# Patient Record
Sex: FEMALE | Race: WHITE | Employment: FULL TIME | URBAN - METROPOLITAN AREA
[De-identification: names, ages, dates, MRNs, and addresses within clinical notes are randomized per-mention and may not be internally consistent; named-entity substitution may affect disease eponyms.]

---

## 2018-06-08 ENCOUNTER — OFFICE VISIT (OUTPATIENT)
Dept: FAMILY MEDICINE CLINIC | Age: 48
End: 2018-06-08

## 2018-06-08 VITALS
BODY MASS INDEX: 20.93 KG/M2 | HEIGHT: 65 IN | OXYGEN SATURATION: 99 % | WEIGHT: 125.6 LBS | DIASTOLIC BLOOD PRESSURE: 58 MMHG | HEART RATE: 72 BPM | SYSTOLIC BLOOD PRESSURE: 102 MMHG | RESPIRATION RATE: 16 BRPM

## 2018-06-08 DIAGNOSIS — Z12.39 SCREENING FOR BREAST CANCER: ICD-10-CM

## 2018-06-08 DIAGNOSIS — Z12.11 SCREENING FOR COLON CANCER: ICD-10-CM

## 2018-06-08 DIAGNOSIS — Z11.4 SCREENING FOR HIV (HUMAN IMMUNODEFICIENCY VIRUS): Primary | ICD-10-CM

## 2018-06-08 DIAGNOSIS — Z00.00 ANNUAL PHYSICAL EXAM: ICD-10-CM

## 2018-06-08 DIAGNOSIS — E55.9 VITAMIN D DEFICIENCY: ICD-10-CM

## 2018-06-08 DIAGNOSIS — R82.90 ABNORMAL URINALYSIS: ICD-10-CM

## 2018-06-08 LAB
ALBUMIN SERPL-MCNC: 4.2 G/DL (ref 3.4–5)
ALP BLD-CCNC: 41 U/L (ref 40–129)
ALT SERPL-CCNC: 17 U/L (ref 10–40)
ANION GAP SERPL CALCULATED.3IONS-SCNC: 14 MMOL/L (ref 3–16)
AST SERPL-CCNC: 20 U/L (ref 15–37)
BILIRUB SERPL-MCNC: 0.5 MG/DL (ref 0–1)
BILIRUBIN DIRECT: <0.2 MG/DL (ref 0–0.3)
BILIRUBIN, INDIRECT: NORMAL MG/DL (ref 0–1)
BILIRUBIN, POC: 0
BLOOD URINE, POC: ABNORMAL
BUN BLDV-MCNC: 14 MG/DL (ref 7–20)
CALCIUM SERPL-MCNC: 9.2 MG/DL (ref 8.3–10.6)
CHLORIDE BLD-SCNC: 99 MMOL/L (ref 99–110)
CHOLESTEROL, TOTAL: 176 MG/DL (ref 0–199)
CLARITY, POC: CLEAR
CO2: 25 MMOL/L (ref 21–32)
COLOR, POC: YELLOW
CREAT SERPL-MCNC: 0.9 MG/DL (ref 0.6–1.1)
GFR AFRICAN AMERICAN: >60
GFR NON-AFRICAN AMERICAN: >60
GLUCOSE BLD-MCNC: 79 MG/DL (ref 70–99)
GLUCOSE URINE, POC: 0
HCT VFR BLD CALC: 41.9 % (ref 36–48)
HDLC SERPL-MCNC: 38 MG/DL (ref 40–60)
HEMOGLOBIN: 14.1 G/DL (ref 12–16)
KETONES, POC: 0
LDL CHOLESTEROL CALCULATED: 110 MG/DL
LEUKOCYTE EST, POC: ABNORMAL
MCH RBC QN AUTO: 30.6 PG (ref 26–34)
MCHC RBC AUTO-ENTMCNC: 33.6 G/DL (ref 31–36)
MCV RBC AUTO: 91.2 FL (ref 80–100)
NITRITE, POC: 0
PDW BLD-RTO: 13.1 % (ref 12.4–15.4)
PH, POC: 7.5
PHOSPHORUS: 3.6 MG/DL (ref 2.5–4.9)
PLATELET # BLD: 273 K/UL (ref 135–450)
PMV BLD AUTO: 9.8 FL (ref 5–10.5)
POTASSIUM SERPL-SCNC: 4.5 MMOL/L (ref 3.5–5.1)
PROTEIN, POC: 0
RBC # BLD: 4.59 M/UL (ref 4–5.2)
SODIUM BLD-SCNC: 138 MMOL/L (ref 136–145)
SPECIFIC GRAVITY, POC: 1.02
TOTAL PROTEIN: 7.1 G/DL (ref 6.4–8.2)
TRIGL SERPL-MCNC: 138 MG/DL (ref 0–150)
TSH SERPL DL<=0.05 MIU/L-ACNC: 2.04 UIU/ML (ref 0.27–4.2)
UROBILINOGEN, POC: 0.2
VITAMIN D 25-HYDROXY: 86.3 NG/ML
VLDLC SERPL CALC-MCNC: 28 MG/DL
WBC # BLD: 8.8 K/UL (ref 4–11)

## 2018-06-08 PROCEDURE — 81002 URINALYSIS NONAUTO W/O SCOPE: CPT | Performed by: INTERNAL MEDICINE

## 2018-06-08 PROCEDURE — 99396 PREV VISIT EST AGE 40-64: CPT | Performed by: INTERNAL MEDICINE

## 2018-06-08 PROCEDURE — 36415 COLL VENOUS BLD VENIPUNCTURE: CPT | Performed by: INTERNAL MEDICINE

## 2018-06-08 RX ORDER — ESTRADIOL 0.1 MG/G
CREAM VAGINAL
COMMUNITY
Start: 2018-05-07

## 2018-06-08 RX ORDER — NORETHINDRONE ACETATE AND ETHINYL ESTRADIOL 1; .02 MG/1; MG/1
TABLET ORAL
COMMUNITY
End: 2021-08-27

## 2018-06-08 ASSESSMENT — ENCOUNTER SYMPTOMS
RESPIRATORY NEGATIVE: 1
EYES NEGATIVE: 1
ALLERGIC/IMMUNOLOGIC NEGATIVE: 1
GASTROINTESTINAL NEGATIVE: 1

## 2018-06-08 ASSESSMENT — PATIENT HEALTH QUESTIONNAIRE - PHQ9
1. LITTLE INTEREST OR PLEASURE IN DOING THINGS: 0
SUM OF ALL RESPONSES TO PHQ9 QUESTIONS 1 & 2: 0
SUM OF ALL RESPONSES TO PHQ QUESTIONS 1-9: 0
2. FEELING DOWN, DEPRESSED OR HOPELESS: 0

## 2018-06-11 ENCOUNTER — TELEPHONE (OUTPATIENT)
Dept: FAMILY MEDICINE CLINIC | Age: 48
End: 2018-06-11

## 2018-06-11 LAB
HIV AG/AB: NORMAL
HIV ANTIGEN: NORMAL
HIV-1 ANTIBODY: NORMAL
HIV-2 AB: NORMAL
ORGANISM: ABNORMAL
URINE CULTURE, ROUTINE: ABNORMAL
URINE CULTURE, ROUTINE: ABNORMAL

## 2018-07-08 PROBLEM — Z00.00 ANNUAL PHYSICAL EXAM: Status: RESOLVED | Noted: 2018-06-08 | Resolved: 2018-07-08

## 2019-01-18 ENCOUNTER — TELEPHONE (OUTPATIENT)
Dept: OTHER | Age: 49
End: 2019-01-18

## 2019-03-08 ENCOUNTER — OFFICE VISIT (OUTPATIENT)
Dept: FAMILY MEDICINE CLINIC | Age: 49
End: 2019-03-08
Payer: COMMERCIAL

## 2019-03-08 VITALS
DIASTOLIC BLOOD PRESSURE: 62 MMHG | WEIGHT: 127.8 LBS | SYSTOLIC BLOOD PRESSURE: 104 MMHG | OXYGEN SATURATION: 98 % | TEMPERATURE: 98.5 F | HEART RATE: 91 BPM | HEIGHT: 65 IN | BODY MASS INDEX: 21.29 KG/M2

## 2019-03-08 DIAGNOSIS — R52 BODY ACHES: ICD-10-CM

## 2019-03-08 DIAGNOSIS — J02.0 ACUTE STREPTOCOCCAL PHARYNGITIS: Primary | ICD-10-CM

## 2019-03-08 LAB
INFLUENZA A ANTIBODY: NEGATIVE
INFLUENZA B ANTIBODY: NEGATIVE
S PYO AG THROAT QL: POSITIVE

## 2019-03-08 PROCEDURE — 87880 STREP A ASSAY W/OPTIC: CPT | Performed by: PHYSICIAN ASSISTANT

## 2019-03-08 PROCEDURE — 87804 INFLUENZA ASSAY W/OPTIC: CPT | Performed by: PHYSICIAN ASSISTANT

## 2019-03-08 PROCEDURE — 99213 OFFICE O/P EST LOW 20 MIN: CPT | Performed by: PHYSICIAN ASSISTANT

## 2019-03-08 RX ORDER — AMOXICILLIN 500 MG/1
500 CAPSULE ORAL 3 TIMES DAILY
Qty: 30 CAPSULE | Refills: 0 | Status: SHIPPED | OUTPATIENT
Start: 2019-03-08 | End: 2019-03-18

## 2019-03-08 ASSESSMENT — PATIENT HEALTH QUESTIONNAIRE - PHQ9
SUM OF ALL RESPONSES TO PHQ9 QUESTIONS 1 & 2: 0
1. LITTLE INTEREST OR PLEASURE IN DOING THINGS: 0
2. FEELING DOWN, DEPRESSED OR HOPELESS: 0
SUM OF ALL RESPONSES TO PHQ QUESTIONS 1-9: 0
SUM OF ALL RESPONSES TO PHQ QUESTIONS 1-9: 0

## 2019-08-15 ENCOUNTER — OFFICE VISIT (OUTPATIENT)
Dept: FAMILY MEDICINE CLINIC | Age: 49
End: 2019-08-15
Payer: COMMERCIAL

## 2019-08-15 VITALS
BODY MASS INDEX: 21.89 KG/M2 | HEART RATE: 69 BPM | TEMPERATURE: 98.6 F | HEIGHT: 65 IN | RESPIRATION RATE: 16 BRPM | WEIGHT: 131.4 LBS | SYSTOLIC BLOOD PRESSURE: 102 MMHG | DIASTOLIC BLOOD PRESSURE: 60 MMHG | OXYGEN SATURATION: 98 %

## 2019-08-15 DIAGNOSIS — J02.9 ACUTE VIRAL PHARYNGITIS: ICD-10-CM

## 2019-08-15 DIAGNOSIS — J02.0 ACUTE STREPTOCOCCAL PHARYNGITIS: Primary | ICD-10-CM

## 2019-08-15 LAB — S PYO AG THROAT QL: NORMAL

## 2019-08-15 PROCEDURE — 99212 OFFICE O/P EST SF 10 MIN: CPT | Performed by: INTERNAL MEDICINE

## 2019-08-15 PROCEDURE — 87880 STREP A ASSAY W/OPTIC: CPT | Performed by: INTERNAL MEDICINE

## 2019-08-15 ASSESSMENT — ENCOUNTER SYMPTOMS
SORE THROAT: 1
RHINORRHEA: 0
RESPIRATORY NEGATIVE: 1
ALLERGIC/IMMUNOLOGIC NEGATIVE: 1
SINUS PRESSURE: 0
SINUS PAIN: 0
VOICE CHANGE: 0
TROUBLE SWALLOWING: 0
GASTROINTESTINAL NEGATIVE: 1

## 2019-08-28 ENCOUNTER — OFFICE VISIT (OUTPATIENT)
Dept: FAMILY MEDICINE CLINIC | Age: 49
End: 2019-08-28
Payer: COMMERCIAL

## 2019-08-28 VITALS
DIASTOLIC BLOOD PRESSURE: 80 MMHG | HEIGHT: 65 IN | RESPIRATION RATE: 16 BRPM | SYSTOLIC BLOOD PRESSURE: 119 MMHG | OXYGEN SATURATION: 99 % | WEIGHT: 133.2 LBS | BODY MASS INDEX: 22.19 KG/M2 | HEART RATE: 65 BPM | TEMPERATURE: 97.8 F

## 2019-08-28 DIAGNOSIS — J02.9 SORE THROAT: Primary | ICD-10-CM

## 2019-08-28 DIAGNOSIS — J06.9 ACUTE UPPER RESPIRATORY INFECTION: ICD-10-CM

## 2019-08-28 DIAGNOSIS — J03.01 RECURRENT STREPTOCOCCAL TONSILLITIS: ICD-10-CM

## 2019-08-28 LAB — S PYO AG THROAT QL: NORMAL

## 2019-08-28 PROCEDURE — 87880 STREP A ASSAY W/OPTIC: CPT | Performed by: PHYSICIAN ASSISTANT

## 2019-08-28 PROCEDURE — 99213 OFFICE O/P EST LOW 20 MIN: CPT | Performed by: PHYSICIAN ASSISTANT

## 2019-08-28 RX ORDER — AMOXICILLIN AND CLAVULANATE POTASSIUM 875; 125 MG/1; MG/1
1 TABLET, FILM COATED ORAL 2 TIMES DAILY
Qty: 14 TABLET | Refills: 0 | Status: SHIPPED | OUTPATIENT
Start: 2019-08-28 | End: 2019-09-04

## 2019-08-29 ENCOUNTER — OFFICE VISIT (OUTPATIENT)
Dept: ENT CLINIC | Age: 49
End: 2019-08-29
Payer: COMMERCIAL

## 2019-08-29 VITALS
HEIGHT: 65 IN | BODY MASS INDEX: 21.86 KG/M2 | WEIGHT: 131.2 LBS | HEART RATE: 72 BPM | SYSTOLIC BLOOD PRESSURE: 120 MMHG | TEMPERATURE: 98.3 F | DIASTOLIC BLOOD PRESSURE: 74 MMHG

## 2019-08-29 DIAGNOSIS — J03.91 RECURRENT TONSILLITIS: Primary | ICD-10-CM

## 2019-08-29 PROCEDURE — 99203 OFFICE O/P NEW LOW 30 MIN: CPT | Performed by: OTOLARYNGOLOGY

## 2019-08-29 ASSESSMENT — ENCOUNTER SYMPTOMS
SORE THROAT: 1
COUGH: 0
FACIAL SWELLING: 0
SINUS PRESSURE: 0
EYE ITCHING: 0
VOMITING: 0
SHORTNESS OF BREATH: 0
VOICE CHANGE: 0
TROUBLE SWALLOWING: 0
NAUSEA: 0
APNEA: 0

## 2019-08-29 NOTE — PROGRESS NOTES
Head: Normocephalic and atraumatic. Right Ear: Tympanic membrane, external ear and ear canal normal. No drainage. No middle ear effusion. Left Ear: Tympanic membrane, external ear and ear canal normal. No drainage. No middle ear effusion. Nose: Septal deviation (Right) present. No mucosal edema or rhinorrhea. Mouth/Throat: Uvula is midline, oropharynx is clear and moist and mucous membranes are normal. Tonsils are 2+ on the right. Tonsils are 2+ on the left. No tonsillar exudate. Eyes: Pupils are equal, round, and reactive to light. EOM are normal.   Neck: Trachea normal, full passive range of motion without pain and phonation normal. No thyroid mass and no thyromegaly present. Cardiovascular: Normal pulses. Pulmonary/Chest: Effort normal. No accessory muscle usage or stridor. No apnea. No respiratory distress. Lymphadenopathy:        Head (right side): No submental and no submandibular adenopathy present. Head (left side): No submental and no submandibular adenopathy present. She has no cervical adenopathy. Right cervical: No superficial cervical, no deep cervical and no posterior cervical adenopathy present. Left cervical: No superficial cervical, no deep cervical and no posterior cervical adenopathy present. Neurological: She is alert and oriented to person, place, and time. She has normal strength. No cranial nerve deficit. Coordination and gait normal.   Skin: Skin is warm and dry. Psychiatric: She has a normal mood and affect. Thought content normal.       Assessment and Plan     1. Recurrent tonsillitis  -Explained options of continued medical management versus tonsillectomy  -She does not wish to proceed with any additional treatment at this time  -No evidence of acute bacterial infection today  -Return as needed      Kieran Moore DO  8/29/19      Portions of this note were dictated using Dragon.  There may be linguistic errors secondary to the use of this

## 2020-04-09 ENCOUNTER — OFFICE VISIT (OUTPATIENT)
Dept: PRIMARY CARE CLINIC | Age: 50
End: 2020-04-09
Payer: COMMERCIAL

## 2020-04-09 VITALS — TEMPERATURE: 97.9 F | HEART RATE: 70 BPM | OXYGEN SATURATION: 98 %

## 2020-04-09 PROCEDURE — 99213 OFFICE O/P EST LOW 20 MIN: CPT | Performed by: NURSE PRACTITIONER

## 2020-04-09 RX ORDER — AMOXICILLIN 875 MG/1
875 TABLET, COATED ORAL 2 TIMES DAILY
Qty: 20 TABLET | Refills: 0 | Status: SHIPPED | OUTPATIENT
Start: 2020-04-09 | End: 2020-04-19

## 2020-04-09 NOTE — PROGRESS NOTES
4/9/2020    FLU/COVID-19 CLINIC EVALUATION    HPI   SYMPTOMS:    Symptom duration, days:since February   [] 1   [] 2   [] 3   [] 4   [] 5   [] 6   [] 7   [] 8   [] 9   [] 10   [] 11   [] 12   [] 13 [x] 14 +    Symptom course:   [x] Worsening     [] Stable     [] Improving    [] Fevers   [] Symptom (not measured)  [] Measured (Result: )  [] Chills  [] Cough   [] Coughing up blood  [] Productive  [] Dry  [] Chest Congestion  [] Chest Tightness  [] Nasal Congestion  [] Runny Nose  [] Feeling short of breath   Fatigue  [] Chest pain  [x] Headaches   []Tolerable  [] Severe  [x] Sore throat  [] Muscle aches  [] Nausea  [] Decreased appetite  [] Vomiting  []Unable to keep fluids down  [] Diarrhea  []Severe    [] OTHER SYMPTOMS:  Ear pressure     RISK FACTORS:  HX of strep    [] Pregnant or possibly pregnant  [] Age over 61  [] Diabetes  [] Heart disease  [] Asthma  [] COPD/Other chronic lung diseases  [] Active Cancer  [] On Chemotherapy  [] Taking oral steroids  [] History Lymphoma/Leukemia  [] Close contact with a lab confirmed COVID-19 patient within 14 days of symptom onset  [] History of travel from affected geographical areas within 14 days of symptom onset  [] Health Care Worker Exposure no symptoms  [] Health Care Worker Exposure symptomatic      VITALS:  Vitals:    04/09/20 0849   Pulse: 70   Temp: 97.9 °F (36.6 °C)   TempSrc: Oral   SpO2: 98%      PHYSICAL EXAMINATION:  [x]Alert   [x]Oriented to person/place/time    [x]No apparent distress     []Toxic appearing    [x]Breathing appears normal     []Appears tachypneic         [x]Speaking in full sentences     TESTS: none  POCT FLU:  [] Positive     []Negative  POCT STREP:  [] Positive     []Negative    [] COVID-19 Test sent:      ASSESSMENT:  [] Flu  [x] Strep Throat  [] Uncertain Viral Respiratory Illness  [] Possible COVID-19  [] Exposure COVID-19  [] Other    PLAN:  [x] Discharge home with written instructions for:  [] Flu management  [x] Strep throat

## 2020-07-09 ENCOUNTER — TELEPHONE (OUTPATIENT)
Dept: FAMILY MEDICINE CLINIC | Age: 50
End: 2020-07-09

## 2020-07-09 NOTE — TELEPHONE ENCOUNTER
Patient has the following symptoms:  Headache, sore throat, cough, fever, body aches, fatigue. She called the [phone] number and cannot get her in until Monday. I gave her the phone number to the [facility] location. Any other suggestions?

## 2020-07-09 NOTE — TELEPHONE ENCOUNTER
Tried to call pt no answer- also may recommend [facility] or [facility] or urgent care

## 2020-07-13 ENCOUNTER — VIRTUAL VISIT (OUTPATIENT)
Dept: FAMILY MEDICINE CLINIC | Age: 50
End: 2020-07-13
Payer: COMMERCIAL

## 2020-07-13 ENCOUNTER — OFFICE VISIT (OUTPATIENT)
Dept: PRIMARY CARE CLINIC | Age: 50
End: 2020-07-13
Payer: COMMERCIAL

## 2020-07-13 PROCEDURE — 99211 OFF/OP EST MAY X REQ PHY/QHP: CPT | Performed by: NURSE PRACTITIONER

## 2020-07-13 PROCEDURE — 99213 OFFICE O/P EST LOW 20 MIN: CPT | Performed by: PHYSICIAN ASSISTANT

## 2020-07-13 RX ORDER — AMOXICILLIN 500 MG/1
500 CAPSULE ORAL 3 TIMES DAILY
Qty: 30 CAPSULE | Refills: 0 | Status: SHIPPED | OUTPATIENT
Start: 2020-07-13 | End: 2020-07-23

## 2020-07-13 ASSESSMENT — ENCOUNTER SYMPTOMS
SORE THROAT: 1
RESPIRATORY NEGATIVE: 1

## 2020-07-13 NOTE — PROGRESS NOTES
concerns as mentioned above. A caregiver was present when appropriate. Due to this being a TeleHealth encounter (During [facility] public [facility] emergency), evaluation of the following organ systems was limited: Vitals/Constitutional/EENT/Resp/CV/GI//MS/Neuro/Skin/Heme-Lymph-Imm. Pursuant to the emergency declaration under the [address] [place] [address], [address] [place] [address] authority and the [employer] Act, this Virtual Visit was conducted with patient's (and/or legal guardian's) consent, to reduce the patient's risk of exposure to COVID-19 and provide necessary medical care. The patient (and/or legal guardian) has also been advised to contact this office for worsening conditions or problems, and seek emergency medical treatment and/or call 911 if deemed necessary. Patient identification was verified at the start of the visit: Yes    Total time spent on this encounter: 20    Services were provided through a video synchronous discussion virtually to substitute for in-person clinic visit. Patient and provider were located at their individual homes. --HOPE Rios on 7/13/2020 at 8:26 AM    An electronic signature was used to authenticate this note.

## 2020-07-13 NOTE — PROGRESS NOTES
Shelley Cannon received a viral test for COVID-19. They were educated on isolation and quarantine as appropriate. For any symptoms, they were directed to seek care from their PCP, given contact information to establish with a doctor, directed to an urgent care or the emergency room.

## 2020-07-18 LAB
SARS-COV-2: NOT DETECTED
SOURCE: NORMAL

## 2021-08-18 ENCOUNTER — HOSPITAL ENCOUNTER (EMERGENCY)
Age: 51
Discharge: HOME | End: 2021-08-18
Attending: EMERGENCY MEDICINE
Payer: COMMERCIAL

## 2021-08-18 ENCOUNTER — OFFICE VISIT (OUTPATIENT)
Dept: FAMILY MEDICINE CLINIC | Age: 51
End: 2021-08-18
Payer: COMMERCIAL

## 2021-08-18 VITALS
OXYGEN SATURATION: 96 % | HEIGHT: 65 IN | DIASTOLIC BLOOD PRESSURE: 62 MMHG | HEART RATE: 122 BPM | RESPIRATION RATE: 14 BRPM | BODY MASS INDEX: 21.36 KG/M2 | TEMPERATURE: 100.6 F | SYSTOLIC BLOOD PRESSURE: 98 MMHG | WEIGHT: 128.2 LBS

## 2021-08-18 VITALS
WEIGHT: 128 LBS | RESPIRATION RATE: 18 BRPM | TEMPERATURE: 99.9 F | SYSTOLIC BLOOD PRESSURE: 103 MMHG | BODY MASS INDEX: 21.33 KG/M2 | OXYGEN SATURATION: 98 % | DIASTOLIC BLOOD PRESSURE: 63 MMHG | HEIGHT: 65 IN | HEART RATE: 97 BPM

## 2021-08-18 DIAGNOSIS — N30.00 ACUTE CYSTITIS WITHOUT HEMATURIA: Primary | ICD-10-CM

## 2021-08-18 DIAGNOSIS — R30.0 DYSURIA: ICD-10-CM

## 2021-08-18 DIAGNOSIS — N20.0 RENAL CALCULI: Primary | ICD-10-CM

## 2021-08-18 DIAGNOSIS — R31.9 URINARY TRACT INFECTION WITH HEMATURIA, SITE UNSPECIFIED: ICD-10-CM

## 2021-08-18 DIAGNOSIS — N39.0 URINARY TRACT INFECTION WITH HEMATURIA, SITE UNSPECIFIED: ICD-10-CM

## 2021-08-18 DIAGNOSIS — R50.81 FEVER IN OTHER DISEASES: ICD-10-CM

## 2021-08-18 LAB
A/G RATIO: 1.4 (ref 1.1–2.2)
ALBUMIN SERPL-MCNC: 4.2 G/DL (ref 3.4–5)
ALP BLD-CCNC: 69 U/L (ref 40–129)
ALT SERPL-CCNC: 11 U/L (ref 10–40)
ANION GAP SERPL CALCULATED.3IONS-SCNC: 13 MMOL/L (ref 3–16)
AST SERPL-CCNC: 19 U/L (ref 15–37)
BACTERIA: ABNORMAL /HPF
BASOPHILS ABSOLUTE: 0 K/UL (ref 0–0.2)
BASOPHILS RELATIVE PERCENT: 0.3 %
BILIRUB SERPL-MCNC: 0.9 MG/DL (ref 0–1)
BILIRUBIN URINE: NEGATIVE
BILIRUBIN, POC: NEGATIVE
BLOOD URINE, POC: NORMAL
BLOOD, URINE: ABNORMAL
BUN BLDV-MCNC: 9 MG/DL (ref 7–20)
CALCIUM SERPL-MCNC: 9.3 MG/DL (ref 8.3–10.6)
CHLORIDE BLD-SCNC: 98 MMOL/L (ref 99–110)
CLARITY, POC: NORMAL
CLARITY: ABNORMAL
CO2: 22 MMOL/L (ref 21–32)
COLOR, POC: NORMAL
COLOR: YELLOW
CREAT SERPL-MCNC: 0.9 MG/DL (ref 0.6–1.1)
EOSINOPHILS ABSOLUTE: 0 K/UL (ref 0–0.6)
EOSINOPHILS RELATIVE PERCENT: 0 %
EPITHELIAL CELLS, UA: ABNORMAL /HPF (ref 0–5)
GFR AFRICAN AMERICAN: >60
GFR NON-AFRICAN AMERICAN: >60
GLOBULIN: 3.1 G/DL
GLUCOSE BLD-MCNC: 116 MG/DL (ref 70–99)
GLUCOSE URINE, POC: NEGATIVE
GLUCOSE URINE: NEGATIVE MG/DL
HCG QUALITATIVE: NEGATIVE
HCT VFR BLD CALC: 40.3 % (ref 36–48)
HEMOGLOBIN: 13.7 G/DL (ref 12–16)
KETONES, POC: NEGATIVE
KETONES, URINE: 15 MG/DL
LACTIC ACID: 1.2 MMOL/L (ref 0.4–2)
LEUKOCYTE EST, POC: NORMAL
LEUKOCYTE ESTERASE, URINE: ABNORMAL
LYMPHOCYTES ABSOLUTE: 0.7 K/UL (ref 1–5.1)
LYMPHOCYTES RELATIVE PERCENT: 5.2 %
MCH RBC QN AUTO: 29.9 PG (ref 26–34)
MCHC RBC AUTO-ENTMCNC: 33.9 G/DL (ref 31–36)
MCV RBC AUTO: 88.2 FL (ref 80–100)
MICROSCOPIC EXAMINATION: YES
MONOCYTES ABSOLUTE: 1.5 K/UL (ref 0–1.3)
MONOCYTES RELATIVE PERCENT: 10.4 %
NEUTROPHILS ABSOLUTE: 11.8 K/UL (ref 1.7–7.7)
NEUTROPHILS RELATIVE PERCENT: 84.1 %
NITRITE, POC: POSITIVE
NITRITE, URINE: POSITIVE
PDW BLD-RTO: 12.6 % (ref 12.4–15.4)
PH UA: 8.5 (ref 5–8)
PH, POC: 8
PLATELET # BLD: 222 K/UL (ref 135–450)
PMV BLD AUTO: 9 FL (ref 5–10.5)
POTASSIUM SERPL-SCNC: 3.9 MMOL/L (ref 3.5–5.1)
PROTEIN UA: ABNORMAL MG/DL
PROTEIN, POC: NEGATIVE
RBC # BLD: 4.57 M/UL (ref 4–5.2)
RBC UA: ABNORMAL /HPF (ref 0–4)
SODIUM BLD-SCNC: 133 MMOL/L (ref 136–145)
SPECIFIC GRAVITY UA: 1.01 (ref 1–1.03)
SPECIFIC GRAVITY, POC: 1.02
TOTAL PROTEIN: 7.3 G/DL (ref 6.4–8.2)
URINE TYPE: ABNORMAL
UROBILINOGEN, POC: 0.2
UROBILINOGEN, URINE: 0.2 E.U./DL
WBC # BLD: 14 K/UL (ref 4–11)
WBC UA: ABNORMAL /HPF (ref 0–5)

## 2021-08-18 PROCEDURE — 87077 CULTURE AEROBIC IDENTIFY: CPT

## 2021-08-18 PROCEDURE — 81001 URINALYSIS AUTO W/SCOPE: CPT

## 2021-08-18 PROCEDURE — 96365 THER/PROPH/DIAG IV INF INIT: CPT

## 2021-08-18 PROCEDURE — 83605 ASSAY OF LACTIC ACID: CPT

## 2021-08-18 PROCEDURE — 87186 SC STD MICRODIL/AGAR DIL: CPT

## 2021-08-18 PROCEDURE — 6360000002 HC RX W HCPCS: Performed by: EMERGENCY MEDICINE

## 2021-08-18 PROCEDURE — 36415 COLL VENOUS BLD VENIPUNCTURE: CPT

## 2021-08-18 PROCEDURE — 81002 URINALYSIS NONAUTO W/O SCOPE: CPT | Performed by: PHYSICIAN ASSISTANT

## 2021-08-18 PROCEDURE — 80053 COMPREHEN METABOLIC PANEL: CPT

## 2021-08-18 PROCEDURE — 85025 COMPLETE CBC W/AUTO DIFF WBC: CPT

## 2021-08-18 PROCEDURE — 99215 OFFICE O/P EST HI 40 MIN: CPT | Performed by: PHYSICIAN ASSISTANT

## 2021-08-18 PROCEDURE — 87086 URINE CULTURE/COLONY COUNT: CPT

## 2021-08-18 PROCEDURE — 84703 CHORIONIC GONADOTROPIN ASSAY: CPT

## 2021-08-18 PROCEDURE — 99284 EMERGENCY DEPT VISIT MOD MDM: CPT

## 2021-08-18 PROCEDURE — 2580000003 HC RX 258: Performed by: EMERGENCY MEDICINE

## 2021-08-18 RX ORDER — 0.9 % SODIUM CHLORIDE 0.9 %
1000 INTRAVENOUS SOLUTION INTRAVENOUS ONCE
Status: COMPLETED | OUTPATIENT
Start: 2021-08-18 | End: 2021-08-18

## 2021-08-18 RX ORDER — CEPHALEXIN 500 MG/1
500 CAPSULE ORAL 4 TIMES DAILY
Qty: 28 CAPSULE | Refills: 0 | Status: SHIPPED | OUTPATIENT
Start: 2021-08-18 | End: 2021-08-25

## 2021-08-18 RX ORDER — KETOROLAC TROMETHAMINE 30 MG/ML
15 INJECTION, SOLUTION INTRAMUSCULAR; INTRAVENOUS ONCE
Status: DISCONTINUED | OUTPATIENT
Start: 2021-08-18 | End: 2021-08-18 | Stop reason: HOSPADM

## 2021-08-18 RX ADMIN — CEFTRIAXONE SODIUM 1000 MG: 1 INJECTION, POWDER, FOR SOLUTION INTRAMUSCULAR; INTRAVENOUS at 12:47

## 2021-08-18 RX ADMIN — SODIUM CHLORIDE 1000 ML: 9 INJECTION, SOLUTION INTRAVENOUS at 12:14

## 2021-08-18 ASSESSMENT — PATIENT HEALTH QUESTIONNAIRE - PHQ9
SUM OF ALL RESPONSES TO PHQ QUESTIONS 1-9: 0
SUM OF ALL RESPONSES TO PHQ9 QUESTIONS 1 & 2: 0
2. FEELING DOWN, DEPRESSED OR HOPELESS: 0
SUM OF ALL RESPONSES TO PHQ QUESTIONS 1-9: 0
SUM OF ALL RESPONSES TO PHQ QUESTIONS 1-9: 0
1. LITTLE INTEREST OR PLEASURE IN DOING THINGS: 0

## 2021-08-18 NOTE — ED PROVIDER NOTES
[facility]  ED  EMERGENCY DEPARTMENT ENCOUNTER      Pt Name: Shayy To  MRN: [id number]  Armstrong[place]harvey 1970  Date of evaluation: 2021  Provider: Karen Shaw MD    CHIEF COMPLAINT       Chief Complaint   Patient presents with    Fever     Pt reports passing yesterday at 1100. Pt concerned of possible kidney infection. HISTORY OF PRESENT ILLNESS   (Location/Symptom, Timing/Onset, Context/Setting, Quality, Duration, Modifying Factors, Severity)  Note limiting factors. Shayy To is a 54 y.o. female with past medical history of kidney stones here today complaining of fever and urinary symptoms    Patient states that yesterday she passed a kidney stone. She was having flank pain is sharp stabbing discomfort coming in waves but ultimately passed a kidney stone and notes that since then her pain is completely resolved. She still does note some burning with urination and was having low-grade fevers was concerned she might have developed urine infection. She no longer hurts any flank pain. She notes mild nausea but no vomiting. She does have fevers. No abdominal discomfort. No vaginal bleeding or discharge. No chest pain, cough or shortness of breath. She states she is had UTIs in the past and is concerned for the same    HPI    Nursing Notes were reviewed. REVIEW OF SYSTEMS    (2-9 systems for level 4, 10 or more for level 5)     Review of Systems    Please see HPI for pertinent positive and negative review of system findings. A full 10 system ROS was performed and otherwise negative.         PAST MEDICAL HISTORY     Past Medical History:   Diagnosis Date    Hypercalcemia     Kidney stones     Nausea & vomiting     Thyroid disease          SURGICAL HISTORY       Past Surgical History:   Procedure Laterality Date    ABDOMINOPLASTY  2013    [facility] BREAST ENHANCEMENT SURGERY       SECTION  x 4     CYSTOSCOPY Left     CYSTOSCOPY, LEFT URETEROSCOPY, HOLMIUM LASER STONE MANIPULATION AND LEFT STENT PLACEMENT    PARATHYROIDECTOMY  11/7/2014    WISDOM TOOTH EXTRACTION      as 27year old approx         CURRENT MEDICATIONS       Discharge Medication List as of 8/18/2021  1:35 PM      CONTINUE these medications which have NOT CHANGED    Details   norethindrone-ethinyl estradiol (JUNEL 1/20) 1-20 MG-MCG per tablet Junel Fe 24 1 mg-20 mcg (24)/75 mg (4) tabletHistorical Med      estradiol (ESTRACE) 0.1 MG/GM vaginal cream Historical Med      vitamin D (CHOLECALCIFEROL) 1000 UNIT TABS tablet Take 1,000 Units by mouth daily. ALLERGIES     Promethazine and Macrobid [nitrofurantoin monohyd macro]    FAMILY HISTORY       Family History   Problem Relation Age of Onset    High Blood Pressure Mother     Cancer Mother           SOCIAL HISTORY       Social History     Socioeconomic History    Marital status:      Spouse name: None    Number of children: None    Years of education: None    Highest education level: None   Occupational History    Occupation: [employer]     Comment: psychiatrist   Tobacco Use    Smoking status: Never Smoker    Smokeless tobacco: Never Used   Substance and Sexual Activity    Alcohol use: No     Comment: rarely    Drug use: No    Sexual activity: Yes     Partners: Male   Other Topics Concern    None   Social History Narrative    None     Social Determinants of Health     Financial Resource Strain:     Difficulty of Paying Living Expenses:    Food Insecurity:     Worried About Running Out of Food in the Last Year:     Ran Out of Food in the Last Year:    Transportation Needs:     Lack of Transportation (Medical):      Lack of Transportation (Non-Medical):    Physical Activity:     Days of Exercise per Week:     Minutes of Exercise per Session:    Stress:     Feeling of Stress :    Social Connections:     Frequency of Communication with Friends and Family:     Frequency of Social Gatherings with Friends UTI and was given Rocephin. Discussed possible CT scan to rule out any stone however with myself and the patient feel that is low likelihood at present. She will be given strict return precautions but otherwise discharged home with antibiotics    MDM    CONSULTS     None    Critical Care:   None    REASSESSMENT          PROCEDURE     Unless otherwise noted below, none     Procedures      FINAL IMPRESSION      1. Acute cystitis without hematuria            DISPOSITION/PLAN   DISPOSITION Decision To Discharge 08/18/2021 01:33:42 PM        PATIENT REFERRED TO:  Winter Padilla MD  [address]  [phone]    Schedule an appointment as soon as possible for a visit         DISCHARGE MEDICATIONS:  Discharge Medication List as of 8/18/2021  1:35 PM      START taking these medications    Details   cephALEXin (KEFLEX) 500 MG capsule Take 1 capsule by mouth 4 times daily for 7 days, Disp-28 capsule, R-0Print           Controlled Substances Monitoring:     No flowsheet data found.     (Please note that portions of this note were completed with a voice recognition program.  Efforts were made to edit the dictations but occasionally words are mis-transcribed.)    Benjy Sanchez MD (electronically signed)  Attending Emergency Physician            Rayna De León MD  08/19/21 4610

## 2021-08-18 NOTE — PROGRESS NOTES
[address] COMPLAINT  Chief Complaint   Patient presents with    Urinary Frequency     Pt states she passed a kidney stone yesterday. Then started having urinary frequency, chills and nausea     Chills    Nausea       HISTORY OF PRESENT  ILLNESS  Catherine Bo is a 54 y.o.  female  Thinks she has a UTI. Admits to passing a kidney stone yesterday,urinary frequency, fever, and just does not feel good. Has h/o renal calculi and pyelonephritis. .     ROS:  Remaining 14 ROS were reviewed and are unremarkable for other constitutional, EENT, cardiac, pulmonary, GI, , neurologic, musculoskeletal, or integumentary complaints. PAST MEDICAL/SURGICAL, SOCIAL, &  FAMILY HISTORY:  Reviewed and updated accordingly. ALLERGIES :   Promethazine and Macrobid [nitrofurantoin monohyd macro]    MEDICATIONS:  Prior to Visit Medications    Medication Sig Taking? Authorizing Provider   estradiol (ESTRACE) 0.1 MG/GM vaginal cream  Yes Historical Provider, MD   vitamin D (CHOLECALCIFEROL) 1000 UNIT TABS tablet Take 1,000 Units by mouth daily. Yes Historical Provider, MD   norethindrone-ethinyl estradiol (JUNEL 1/20) 1-20 MG-MCG per tablet Junel Fe 24 1 mg-20 mcg (24)/75 mg (4) tablet  Patient not taking: Reported on 8/18/2021  Historical Provider, MD         PHYSICAL EXAM     Vitals:    08/18/21 1040 08/18/21 1104   BP: 98/62    Site: Right Upper Arm    Position: Sitting    Pulse: 122    Resp: 14    Temp: 98.2 °F (36.8 °C) 100.6 °F (38.1 °C)   SpO2: 96%    Weight: 128 lb 3.2 oz (58.2 kg)    Height: 5' 5\" (1.651 m)    Body mass index is 21.33 kg/m². APPEARANCE: looks ill and toxic. HEENT: Head: Normocephalic, atraumatic. Eyes: EOMI,PERRLA. Conjunctiva pink and moist. Sclera white. Ears: Hearing intact. Nose/ Mouth: not examined today. NECK: No lymphadenopathy or masses. Moves neck fully. HEART: Reg rate and rhythm. No murmurs, rubs, or gallops. LUNGS: Clear to auscultation.  No wheezes, rales, or rhonchi. ABDOMEN:  Soft, bowel sounds present, non-tender, no masses or organomegaly. No CVAT  MUSCULOSKELETAL:  No clubbing, cyanosis or edema. Moves all joints. NEUROLOGIC: Normal gross and sensory exam.  SKIN: Warm, dry, normal turgor. Cap refill <3secs. No rashes, petechiae, purpura. ADDITIONAL STUDIES     Pertinent prior laboratory results and/or imaging reviewed. Orders Placed This Encounter   Procedures    Culture, Urine    POCT Urinalysis no Micro       IMPRESSION/ PLAN     1. Renal calculi    2. Urinary tract infection with hematuria, site unspecified    3. Fever in other diseases    4. Dysuria      Looks toxic. Sent to ED. Diff dx includes but not limited to pyelonephritis, renal calculi, sepsis. ED contacted-spoke with nurse Lake. Urine culture has been sent to lab.

## 2021-08-20 LAB
ORGANISM: ABNORMAL
ORGANISM: ABNORMAL
URINE CULTURE, ROUTINE: ABNORMAL
URINE CULTURE, ROUTINE: ABNORMAL

## 2021-08-27 ENCOUNTER — OFFICE VISIT (OUTPATIENT)
Dept: FAMILY MEDICINE CLINIC | Age: 51
End: 2021-08-27
Payer: COMMERCIAL

## 2021-08-27 VITALS
SYSTOLIC BLOOD PRESSURE: 112 MMHG | RESPIRATION RATE: 16 BRPM | WEIGHT: 127 LBS | HEIGHT: 65 IN | BODY MASS INDEX: 21.16 KG/M2 | OXYGEN SATURATION: 98 % | DIASTOLIC BLOOD PRESSURE: 60 MMHG | HEART RATE: 82 BPM

## 2021-08-27 DIAGNOSIS — B95.2 UTI (URINARY TRACT INFECTION) DUE TO ENTEROCOCCUS: Primary | ICD-10-CM

## 2021-08-27 DIAGNOSIS — N39.0 UTI (URINARY TRACT INFECTION) DUE TO ENTEROCOCCUS: Primary | ICD-10-CM

## 2021-08-27 DIAGNOSIS — E21.3 HYPERPARATHYROIDISM (HCC): ICD-10-CM

## 2021-08-27 DIAGNOSIS — N30.01 ACUTE CYSTITIS WITH HEMATURIA: ICD-10-CM

## 2021-08-27 PROBLEM — N30.90 CYSTITIS: Status: ACTIVE | Noted: 2021-08-27

## 2021-08-27 LAB
BILIRUBIN, POC: 0
BLOOD URINE, POC: NORMAL
CLARITY, POC: CLEAR
COLOR, POC: YELLOW
GLUCOSE URINE, POC: 0
KETONES, POC: 0
LEUKOCYTE EST, POC: NORMAL
NITRITE, POC: 0
PH, POC: 6.5
PROTEIN, POC: 0
SPECIFIC GRAVITY, POC: 1.02
UROBILINOGEN, POC: 0.2

## 2021-08-27 PROCEDURE — 81002 URINALYSIS NONAUTO W/O SCOPE: CPT | Performed by: INTERNAL MEDICINE

## 2021-08-27 PROCEDURE — 99213 OFFICE O/P EST LOW 20 MIN: CPT | Performed by: INTERNAL MEDICINE

## 2021-08-27 ASSESSMENT — ENCOUNTER SYMPTOMS
RESPIRATORY NEGATIVE: 1
GASTROINTESTINAL NEGATIVE: 1

## 2021-08-27 NOTE — PROGRESS NOTES
[facility] presents today for   Chief Complaint   Patient presents with    Other     er f/u uti        Acute cystitis with hematuria  Went ER 8/18/2021 for Cystitis. Just passed Kidney stone. Has severe UTI. tx w/ Keflex. [id number] Feeling better. Has finished meds. No fever, chills, no reoccurrence of stones. Hyperparathyroidism ([address]   S/p Parathyroidectomy. (3 Parathyroids left). Had multiple stones and still passing stones from prior to surgery. Review of Systems   Constitutional: Negative. HENT: Negative. Respiratory: Negative. Cardiovascular: Negative. Gastrointestinal: Negative. Endocrine: Negative. Genitourinary: Negative. Musculoskeletal: Negative. Skin: Negative. Neurological: Negative. Hematological: Negative. Vitals:    08/27/21 1644   BP: 112/60   Pulse: 82   Resp: 16   SpO2: 98%   Weight: 127 lb (57.6 kg)   Height: 5' 5\" (1.651 m)        Physical Exam  Constitutional:       General: She is not in acute distress. Appearance: She is well-developed. She is not ill-appearing, toxic-appearing or diaphoretic. HENT:      Head: Normocephalic and atraumatic. Eyes:      Conjunctiva/sclera: Conjunctivae normal.      Pupils: Pupils are equal, round, and reactive to light. Neck:      Thyroid: No thyroid mass or thyromegaly. Vascular: Normal carotid pulses. No carotid bruit, hepatojugular reflux or JVD. Trachea: Trachea normal. No tracheal deviation. Cardiovascular:      Rate and Rhythm: Normal rate and regular rhythm. No extrasystoles are present. Chest Wall: PMI is not displaced. Pulses: Normal pulses. No decreased pulses. Carotid pulses are 2+ on the right side and 2+ on the left side. Radial pulses are 2+ on the right side and 2+ on the left side. Femoral pulses are 2+ on the right side and 2+ on the left side. Popliteal pulses are 2+ on the right side and 2+ on the left side.         Dorsalis Judgment normal.          A/P:     Acute Cystitis With Hematuria. Resolved. If reoccurrence to uro. Hyperparathyroidism (Hcc). S/p surgery w/ improved Calcium.                 Haider Trevino MD

## 2021-08-27 NOTE — PATIENT INSTRUCTIONS
A/P:     Acute Cystitis With Hematuria. Resolved. If reoccurrence to uro. Hyperparathyroidism (Hcc). S/p surgery w/ improved Calcium.                 Kan Kumar MD

## 2021-08-27 NOTE — ASSESSMENT & PLAN NOTE
Went ER 8/18/2021 for Cystitis. Just passed Kidney stone. Has severe UTI. tx w/ Keflex. [id number] Feeling better. Has finished meds. No fever, chills, no reoccurrence of stones.

## 2021-08-27 NOTE — ASSESSMENT & PLAN NOTE
S/p Parathyroidectomy. (3 Parathyroids left). Had multiple stones and still passing stones from prior to surgery.

## 2021-08-30 LAB
ORGANISM: ABNORMAL
URINE CULTURE, ROUTINE: ABNORMAL
URINE CULTURE, ROUTINE: ABNORMAL

## 2022-02-02 ENCOUNTER — OFFICE VISIT (OUTPATIENT)
Dept: FAMILY MEDICINE CLINIC | Age: 52
End: 2022-02-02
Payer: COMMERCIAL

## 2022-02-02 VITALS
BODY MASS INDEX: 22.69 KG/M2 | WEIGHT: 136.2 LBS | DIASTOLIC BLOOD PRESSURE: 60 MMHG | TEMPERATURE: 97.2 F | SYSTOLIC BLOOD PRESSURE: 110 MMHG | RESPIRATION RATE: 16 BRPM | HEIGHT: 65 IN | HEART RATE: 79 BPM | OXYGEN SATURATION: 98 %

## 2022-02-02 DIAGNOSIS — N20.0 RENAL CALCULI: ICD-10-CM

## 2022-02-02 DIAGNOSIS — E21.3 HYPERPARATHYROIDISM (HCC): ICD-10-CM

## 2022-02-02 DIAGNOSIS — Z12.11 COLON CANCER SCREENING: ICD-10-CM

## 2022-02-02 DIAGNOSIS — Z00.00 ANNUAL PHYSICAL EXAM: Primary | ICD-10-CM

## 2022-02-02 DIAGNOSIS — Z23 NEED FOR SHINGLES VACCINE: ICD-10-CM

## 2022-02-02 PROBLEM — J02.9 ACUTE VIRAL PHARYNGITIS: Status: RESOLVED | Noted: 2019-08-15 | Resolved: 2022-02-02

## 2022-02-02 PROCEDURE — 90471 IMMUNIZATION ADMIN: CPT | Performed by: PHYSICIAN ASSISTANT

## 2022-02-02 PROCEDURE — 99396 PREV VISIT EST AGE 40-64: CPT | Performed by: PHYSICIAN ASSISTANT

## 2022-02-02 PROCEDURE — 90750 HZV VACC RECOMBINANT IM: CPT | Performed by: PHYSICIAN ASSISTANT

## 2022-02-02 RX ORDER — UBIDECARENONE 75 MG
50 CAPSULE ORAL DAILY
COMMUNITY

## 2022-02-02 ASSESSMENT — PATIENT HEALTH QUESTIONNAIRE - PHQ9
6. FEELING BAD ABOUT YOURSELF - OR THAT YOU ARE A FAILURE OR HAVE LET YOURSELF OR YOUR FAMILY DOWN: 0
SUM OF ALL RESPONSES TO PHQ QUESTIONS 1-9: 0
SUM OF ALL RESPONSES TO PHQ QUESTIONS 1-9: 0
2. FEELING DOWN, DEPRESSED OR HOPELESS: 0
4. FEELING TIRED OR HAVING LITTLE ENERGY: 0
5. POOR APPETITE OR OVEREATING: 0
7. TROUBLE CONCENTRATING ON THINGS, SUCH AS READING THE NEWSPAPER OR WATCHING TELEVISION: 0
SUM OF ALL RESPONSES TO PHQ9 QUESTIONS 1 & 2: 0
SUM OF ALL RESPONSES TO PHQ QUESTIONS 1-9: 0
8. MOVING OR SPEAKING SO SLOWLY THAT OTHER PEOPLE COULD HAVE NOTICED. OR THE OPPOSITE, BEING SO FIGETY OR RESTLESS THAT YOU HAVE BEEN MOVING AROUND A LOT MORE THAN USUAL: 0
10. IF YOU CHECKED OFF ANY PROBLEMS, HOW DIFFICULT HAVE THESE PROBLEMS MADE IT FOR YOU TO DO YOUR WORK, TAKE CARE OF THINGS AT HOME, OR GET ALONG WITH OTHER PEOPLE: 0
1. LITTLE INTEREST OR PLEASURE IN DOING THINGS: 0
9. THOUGHTS THAT YOU WOULD BE BETTER OFF DEAD, OR OF HURTING YOURSELF: 0
SUM OF ALL RESPONSES TO PHQ QUESTIONS 1-9: 0
3. TROUBLE FALLING OR STAYING ASLEEP: 0

## 2022-02-02 ASSESSMENT — COLUMBIA-SUICIDE SEVERITY RATING SCALE - C-SSRS
6. HAVE YOU EVER DONE ANYTHING, STARTED TO DO ANYTHING, OR PREPARED TO DO ANYTHING TO END YOUR LIFE?: NO
2. HAVE YOU ACTUALLY HAD ANY THOUGHTS OF KILLING YOURSELF?: NO
1. WITHIN THE PAST MONTH, HAVE YOU WISHED YOU WERE DEAD OR WISHED YOU COULD GO TO SLEEP AND NOT WAKE UP?: NO

## 2022-02-02 NOTE — PROGRESS NOTES
[address] EXAMINATION         Date of Exam: 2022    Patient's Name: Dov Alberts   Patient's YOB: 1970       Chief Complaint   Patient presents with   [facility] Established New Doctor     Pt is here to establish care, she previously saw        HPI: Dov Alberts is a 50 y.o. female NEW to provider  who presents for a complete preventive health medical examination. Hyperparathyroid resection ,no sequelae       REVIEW OF SYSTEMS:    Pertinent positive and negatives are in HPI. The remaining 14 ROS were reviewed and are unremarkable for other constitutional, EENT, cardiac, pulmonary, GI, , neurologic, musculoskeletal, or integumentary complaints. Last eye exam:    Glasses 2021  Hearing concerns: No  Last Dental exam:    Every 6 Months  Caffeine use:  1-3/ day  Exercise:  ' very little'  Diet: feels needs improvement on  Feels diet is healthy, Vegetarian since . Alcohol use: none  Tobacco/ Vapping/ marijuana use:  never  Mental Health; concerns of anxiety or depression? No    Perform routine skin checks? No sees derm  Perform monthly self breast exams? No  Last Mammo:   done on 10/2020/    Last gyn exam:   Every 2 years.  Dr Olivia Ravi    Colonoscopy:  No     Living will: no           PAST MEDICAL HISTORY:      Diagnosis Date    H/O parathyroid disease     Hypercalcemia     Kidney stones     Thyroid disease        Past Surgical History:   Procedure Laterality Date    ABDOMINOPLASTY  2013    [facility] BREAST ENHANCEMENT SURGERY       SECTION  x 4     CYSTOSCOPY Left     CYSTOSCOPY, LEFT URETEROSCOPY, HOLMIUM LASER STONE MANIPULATION AND LEFT STENT PLACEMENT    PARATHYROIDECTOMY  2014    WISDOM TOOTH EXTRACTION      as 27year old approx       Family and Social History on file      ALLERGIES:    Promethazine and Macrobid [nitrofurantoin monohyd macro]    MEDICATIONS:  Current Outpatient Medications   Medication Sig Dispense Refill    vitamin B-12 (CYANOCOBALAMIN) 100 MCG tablet Take 50 mcg by mouth daily      estradiol (ESTRACE) 0.1 MG/GM vaginal cream       vitamin D (CHOLECALCIFEROL) 1000 UNIT TABS tablet Take 1,000 Units by mouth daily. No current facility-administered medications for this visit. PHYSICAL EXAM:   Vitals:    02/02/22 1539   BP: 110/60   Site: Left Upper Arm   Position: Sitting   Pulse: 79   Resp: 16   Temp: 97.2 °F (36.2 °C)   TempSrc: Temporal   SpO2: 98%   Weight: 136 lb 3.2 oz (61.8 kg)   Height: 5' 5\" (1.651 m)       Body mass index is 22.66 kg/m². GENERAL APPEARANCE:  Well nourished. No distress. HEENT: Normocephalic. Atraumatic. EYES: Vision intact. EOMI, PERRLA. Conjunctivae pink, moist. Sclera white. Cornea and lens without opacities. EARS: Auricles symmetrical without lesions or deformities. Canals are clear. TM's intact bilaterally. Hearing  intact. NOSE: MOUTH/THROAT:  currently not examined. NECK: No lymphadenopathy. Thyroid smooth, not enlarged. Spine non-tender and full range of motion without pain. LUNGS: Clear to auscultation, no wheezes, rales, or rhonci. Equal resonance to chest percussion bilaterally. CHEST/SPINE: No skin changes or chest wall deformities. No CVAT. No spine or paraspinal muscle tenderness or deformities. Full range of motion in all planes. HEART:  Regular rate and rhythm. No murmurs, rubs, or gallops. VASCULAR:  No jugular venous distension or carotid bruits. Pulses 2+ and symmetrical to carotid, femoral, and dorsalis pedis. Capillary refill <3secs. ABDOMEN: Without scars. Soft, non-tender, normoactive bowel sounds. No pulsatile masses or hepatosplenomegaly. No injuinal nodes or hernias. BREAST/ GYN: deferred. EXTREMITIES: No skin or bony deformities. No new erythema, edema, or exudates. Symmetrical strength. Full range of motion without eliciting pain. Sensation and DTR's are equal and intact. Vascular is intact.    NEUROLOGIC: Grossly non focal. Cranial Nerves II-XII intact. Negative Rhomberg. SKIN: Warm, dry and intact. No rashes, petechia, purpura. No suspicious lesions. No nail clubbing or cyanosis. PSYCHIATRIC:  Answers and understands questions appropriately. Normal affect, behavior,  and mood. ADDITIONAL DATA:  Prior clinic visit notes, labs, and imaging reports were reviewed. Lipid panel:   Lab Results   Component Value Date    TRIG 138 06/08/2018    HDL 38 06/08/2018    LDLCALC 110 06/08/2018      ASSESSMENT & PLAN:    Jenelle Mancuso was seen today for established new doctor. Annual physical exam.  - fasting labs. -Medical records updated today. Instructed on routine monthly skin and breast exams.   -Discussed preventive health measures. Followed by Dr Sophia Aguilar. Needs mammogram.    Cologuard ordered. Zoster Subunit [facility]) #1 of 2    Hyperparathyroidism ([facility] [address]  -     S/p resection. Still has renal calculi in kidneys but no new ones. -     TSH WITH REFLEX TO FT4; Future    Renal calculi   stable.  Last episode 8/8/2021     Follow Up yearly

## 2022-02-21 ENCOUNTER — NURSE ONLY (OUTPATIENT)
Dept: FAMILY MEDICINE CLINIC | Age: 52
End: 2022-02-21
Payer: COMMERCIAL

## 2022-02-21 DIAGNOSIS — Z00.00 ANNUAL PHYSICAL EXAM: ICD-10-CM

## 2022-02-21 DIAGNOSIS — E21.3 HYPERPARATHYROIDISM (HCC): ICD-10-CM

## 2022-02-21 LAB
A/G RATIO: 1.6 (ref 1.1–2.2)
ALBUMIN SERPL-MCNC: 4.6 G/DL (ref 3.4–5)
ALP BLD-CCNC: 74 U/L (ref 40–129)
ALT SERPL-CCNC: 13 U/L (ref 10–40)
ANION GAP SERPL CALCULATED.3IONS-SCNC: 16 MMOL/L (ref 3–16)
AST SERPL-CCNC: 22 U/L (ref 15–37)
BILIRUB SERPL-MCNC: 0.6 MG/DL (ref 0–1)
BUN BLDV-MCNC: 15 MG/DL (ref 7–20)
CALCIUM SERPL-MCNC: 9.6 MG/DL (ref 8.3–10.6)
CHLORIDE BLD-SCNC: 102 MMOL/L (ref 99–110)
CHOLESTEROL, TOTAL: 188 MG/DL (ref 0–199)
CO2: 24 MMOL/L (ref 21–32)
CREAT SERPL-MCNC: 0.8 MG/DL (ref 0.6–1.1)
GFR AFRICAN AMERICAN: >60
GFR NON-AFRICAN AMERICAN: >60
GLUCOSE BLD-MCNC: 84 MG/DL (ref 70–99)
HDLC SERPL-MCNC: 57 MG/DL (ref 40–60)
LDL CHOLESTEROL CALCULATED: 115 MG/DL
POTASSIUM SERPL-SCNC: 4.2 MMOL/L (ref 3.5–5.1)
SODIUM BLD-SCNC: 142 MMOL/L (ref 136–145)
TOTAL PROTEIN: 7.5 G/DL (ref 6.4–8.2)
TRIGL SERPL-MCNC: 82 MG/DL (ref 0–150)
TSH REFLEX FT4: 2.44 UIU/ML (ref 0.27–4.2)
VLDLC SERPL CALC-MCNC: 16 MG/DL

## 2022-02-21 PROCEDURE — 36415 COLL VENOUS BLD VENIPUNCTURE: CPT | Performed by: PHYSICIAN ASSISTANT

## 2022-02-25 LAB — NONINV COLON CA DNA+OCC BLD SCRN STL QL: NEGATIVE

## 2022-06-20 ENCOUNTER — NURSE ONLY (OUTPATIENT)
Dept: FAMILY MEDICINE CLINIC | Age: 52
End: 2022-06-20
Payer: COMMERCIAL

## 2022-06-20 DIAGNOSIS — Z23 NEED FOR SHINGLES VACCINE: Primary | ICD-10-CM

## 2022-06-20 PROCEDURE — 90471 IMMUNIZATION ADMIN: CPT | Performed by: PHYSICIAN ASSISTANT

## 2022-06-20 PROCEDURE — 90750 HZV VACC RECOMBINANT IM: CPT | Performed by: PHYSICIAN ASSISTANT

## 2022-09-12 ENCOUNTER — OFFICE VISIT (OUTPATIENT)
Dept: FAMILY MEDICINE CLINIC | Age: 52
End: 2022-09-12
Payer: COMMERCIAL

## 2022-09-12 ENCOUNTER — HOSPITAL ENCOUNTER (OUTPATIENT)
Age: 52
Discharge: HOME | End: 2022-09-12
Payer: COMMERCIAL

## 2022-09-12 ENCOUNTER — HOSPITAL ENCOUNTER (OUTPATIENT)
Dept: CT IMAGING | Age: 52
Discharge: HOME | End: 2022-09-12
Payer: COMMERCIAL

## 2022-09-12 VITALS
HEIGHT: 65 IN | TEMPERATURE: 98.7 F | WEIGHT: 133 LBS | RESPIRATION RATE: 20 BRPM | OXYGEN SATURATION: 97 % | DIASTOLIC BLOOD PRESSURE: 58 MMHG | SYSTOLIC BLOOD PRESSURE: 100 MMHG | BODY MASS INDEX: 22.16 KG/M2 | HEART RATE: 96 BPM

## 2022-09-12 DIAGNOSIS — R53.83 MALAISE AND FATIGUE: ICD-10-CM

## 2022-09-12 DIAGNOSIS — R30.0 DYSURIA: ICD-10-CM

## 2022-09-12 DIAGNOSIS — N20.0 RENAL CALCULI: Primary | ICD-10-CM

## 2022-09-12 DIAGNOSIS — N39.0 ACUTE UTI (URINARY TRACT INFECTION): ICD-10-CM

## 2022-09-12 DIAGNOSIS — R31.9 HEMATURIA, UNSPECIFIED TYPE: ICD-10-CM

## 2022-09-12 DIAGNOSIS — N20.0 RENAL CALCULI: ICD-10-CM

## 2022-09-12 DIAGNOSIS — R53.81 MALAISE AND FATIGUE: ICD-10-CM

## 2022-09-12 LAB
A/G RATIO: 1.5 (ref 1.1–2.2)
ALBUMIN SERPL-MCNC: 4.6 G/DL (ref 3.4–5)
ALP BLD-CCNC: 73 U/L (ref 40–129)
ALT SERPL-CCNC: 16 U/L (ref 10–40)
ANION GAP SERPL CALCULATED.3IONS-SCNC: 11 MMOL/L (ref 3–16)
AST SERPL-CCNC: 23 U/L (ref 15–37)
BASOPHILS ABSOLUTE: 0 K/UL (ref 0–0.2)
BASOPHILS RELATIVE PERCENT: 0.4 %
BILIRUB SERPL-MCNC: 0.4 MG/DL (ref 0–1)
BILIRUBIN, POC: 0
BLOOD URINE, POC: NORMAL
BUN BLDV-MCNC: 14 MG/DL (ref 7–20)
CALCIUM SERPL-MCNC: 9.2 MG/DL (ref 8.3–10.6)
CHLORIDE BLD-SCNC: 100 MMOL/L (ref 99–110)
CLARITY, POC: NORMAL
CO2: 24 MMOL/L (ref 21–32)
COLOR, POC: YELLOW
CREAT SERPL-MCNC: 0.9 MG/DL (ref 0.6–1.1)
EOSINOPHILS ABSOLUTE: 0 K/UL (ref 0–0.6)
EOSINOPHILS RELATIVE PERCENT: 0 %
GFR AFRICAN AMERICAN: >60
GFR NON-AFRICAN AMERICAN: >60
GLUCOSE BLD-MCNC: 167 MG/DL (ref 70–99)
GLUCOSE URINE, POC: 0
HCT VFR BLD CALC: 38.2 % (ref 36–48)
HEMOGLOBIN: 12.8 G/DL (ref 12–16)
KETONES, POC: NORMAL
LEUKOCYTE EST, POC: NORMAL
LYMPHOCYTES ABSOLUTE: 0.5 K/UL (ref 1–5.1)
LYMPHOCYTES RELATIVE PERCENT: 5.4 %
MCH RBC QN AUTO: 29.1 PG (ref 26–34)
MCHC RBC AUTO-ENTMCNC: 33.4 G/DL (ref 31–36)
MCV RBC AUTO: 87.1 FL (ref 80–100)
MONOCYTES ABSOLUTE: 0.8 K/UL (ref 0–1.3)
MONOCYTES RELATIVE PERCENT: 7.7 %
NEUTROPHILS ABSOLUTE: 8.6 K/UL (ref 1.7–7.7)
NEUTROPHILS RELATIVE PERCENT: 86.5 %
NITRITE, POC: POSITIVE
PDW BLD-RTO: 13.6 % (ref 12.4–15.4)
PH, POC: 5.5
PLATELET # BLD: 229 K/UL (ref 135–450)
PMV BLD AUTO: 9.2 FL (ref 5–10.5)
POTASSIUM SERPL-SCNC: 3.5 MMOL/L (ref 3.5–5.1)
PROTEIN, POC: NORMAL
RBC # BLD: 4.39 M/UL (ref 4–5.2)
SODIUM BLD-SCNC: 135 MMOL/L (ref 136–145)
SPECIFIC GRAVITY, POC: 1.02
TOTAL PROTEIN: 7.7 G/DL (ref 6.4–8.2)
UROBILINOGEN, POC: 0.2
WBC # BLD: 9.9 K/UL (ref 4–11)

## 2022-09-12 PROCEDURE — 80053 COMPREHEN METABOLIC PANEL: CPT

## 2022-09-12 PROCEDURE — 36415 COLL VENOUS BLD VENIPUNCTURE: CPT

## 2022-09-12 PROCEDURE — 74176 CT ABD & PELVIS W/O CONTRAST: CPT

## 2022-09-12 PROCEDURE — 81002 URINALYSIS NONAUTO W/O SCOPE: CPT | Performed by: PHYSICIAN ASSISTANT

## 2022-09-12 PROCEDURE — 85025 COMPLETE CBC W/AUTO DIFF WBC: CPT

## 2022-09-12 PROCEDURE — 99214 OFFICE O/P EST MOD 30 MIN: CPT | Performed by: PHYSICIAN ASSISTANT

## 2022-09-12 RX ORDER — LEVOFLOXACIN 750 MG/1
750 TABLET ORAL DAILY
Qty: 5 TABLET | Refills: 0 | Status: SHIPPED | OUTPATIENT
Start: 2022-09-12 | End: 2022-09-17

## 2022-09-12 RX ORDER — TAMSULOSIN HYDROCHLORIDE 0.4 MG/1
0.4 CAPSULE ORAL DAILY
Qty: 30 CAPSULE | Refills: 0 | Status: SHIPPED | OUTPATIENT
Start: 2022-09-12 | End: 2022-11-02

## 2022-09-12 SDOH — ECONOMIC STABILITY: FOOD INSECURITY: WITHIN THE PAST 12 MONTHS, YOU WORRIED THAT YOUR FOOD WOULD RUN OUT BEFORE YOU GOT MONEY TO BUY MORE.: NEVER TRUE

## 2022-09-12 SDOH — ECONOMIC STABILITY: FOOD INSECURITY: WITHIN THE PAST 12 MONTHS, THE FOOD YOU BOUGHT JUST DIDN'T LAST AND YOU DIDN'T HAVE MONEY TO GET MORE.: NEVER TRUE

## 2022-09-12 ASSESSMENT — PATIENT HEALTH QUESTIONNAIRE - PHQ9
3. TROUBLE FALLING OR STAYING ASLEEP: 0
5. POOR APPETITE OR OVEREATING: 0
7. TROUBLE CONCENTRATING ON THINGS, SUCH AS READING THE NEWSPAPER OR WATCHING TELEVISION: 0
4. FEELING TIRED OR HAVING LITTLE ENERGY: 0
10. IF YOU CHECKED OFF ANY PROBLEMS, HOW DIFFICULT HAVE THESE PROBLEMS MADE IT FOR YOU TO DO YOUR WORK, TAKE CARE OF THINGS AT HOME, OR GET ALONG WITH OTHER PEOPLE: 0
8. MOVING OR SPEAKING SO SLOWLY THAT OTHER PEOPLE COULD HAVE NOTICED. OR THE OPPOSITE, BEING SO FIGETY OR RESTLESS THAT YOU HAVE BEEN MOVING AROUND A LOT MORE THAN USUAL: 0
SUM OF ALL RESPONSES TO PHQ QUESTIONS 1-9: 0
6. FEELING BAD ABOUT YOURSELF - OR THAT YOU ARE A FAILURE OR HAVE LET YOURSELF OR YOUR FAMILY DOWN: 0
9. THOUGHTS THAT YOU WOULD BE BETTER OFF DEAD, OR OF HURTING YOURSELF: 0
SUM OF ALL RESPONSES TO PHQ QUESTIONS 1-9: 0
1. LITTLE INTEREST OR PLEASURE IN DOING THINGS: 0
SUM OF ALL RESPONSES TO PHQ9 QUESTIONS 1 & 2: 0
SUM OF ALL RESPONSES TO PHQ QUESTIONS 1-9: 0
2. FEELING DOWN, DEPRESSED OR HOPELESS: 0
SUM OF ALL RESPONSES TO PHQ QUESTIONS 1-9: 0

## 2022-09-12 ASSESSMENT — SOCIAL DETERMINANTS OF HEALTH (SDOH): HOW HARD IS IT FOR YOU TO PAY FOR THE VERY BASICS LIKE FOOD, HOUSING, MEDICAL CARE, AND HEATING?: NOT HARD AT ALL

## 2022-09-12 NOTE — PROGRESS NOTES
[facility]    CC:   Chief Complaint   Patient presents with    Dysuria     Patient is here with complaints of dysuria, she states she thought she passed a kidney stone, has chills, fatigue, fever. History of Present Illness:    Eran Valencia is a 50 y.o. female with h/o hyperPTH, thinks she passed a left sided kidney stone yesterday. Did not see the stone. Felt the pain, was naseous and had fever [id number]. Today still feels ill with urinary symptoms. Her last stone passed was a year ago and was seen w Urology. Last acute on chronic cystitis was June 2022 which took 3 rounds of antibiotics to finally resolve. Sherlyn Stevenson placed her on estradiol for UTI control. Continues with nausea, chills and just does not feel well. Taking \"lots\" of Advil. Denies: Denies GYN  or GI complaints including abnormal vaginal discharge or vaginal bleeding . No bowel habit changes, melena, hematochezia. Physical exam:      Vitals:    09/12/22 1304   BP: (!) 100/58   Pulse: 96   Resp: 16   Temp: 97.3 °F (36.3 °C)   TempSrc: Temporal   SpO2: 97%   Weight: 133 lb (60.3 kg)   Height: 5' 5\" (1.651 m)     APPEARANCE:    No acute distress. Looks ill, non toxic. HEART: Reg rate and rhythm. No murmurs, rubs, or gallops. LUNGS: Clear to auscultation. No wheezes, rales, or rhonchi. ABDOMEN:  Soft, bowel sounds present,  non-tender, no masses or HSM. No CVAT. NEUROLOGIC: grossly non focal  SKIN: Warm, dry, normal turgor. Cap refill <3secs. No rashes, petechiae, purpura.        Laboratory:   Results for POC orders placed in visit on 09/12/22   POCT Urinalysis no Micro   Result Value Ref Range    Color, UA yellow     Clarity, UA cloudy     Glucose, UA POC 0     Bilirubin, UA 0     Ketones, UA 15 mg     Spec Grav, UA 1.020     Blood, UA POC Moderate     pH, UA 5.5     Protein, UA POC 30 mg     Urobilinogen, UA 0.2     Leukocytes, UA Large     Nitrite, UA Positive       Urine culture

## 2022-09-14 LAB
ORGANISM: ABNORMAL
URINE CULTURE, ROUTINE: ABNORMAL

## 2022-09-14 NOTE — RESULT ENCOUNTER NOTE
Patient was advised of results and voiced understanding, she states she did have the blood work and they are visible so should be in her chart.

## 2022-10-03 ENCOUNTER — OFFICE VISIT (OUTPATIENT)
Dept: FAMILY MEDICINE CLINIC | Age: 52
End: 2022-10-03
Payer: COMMERCIAL

## 2022-10-03 VITALS
BODY MASS INDEX: 23.32 KG/M2 | OXYGEN SATURATION: 97 % | TEMPERATURE: 97.4 F | HEART RATE: 69 BPM | SYSTOLIC BLOOD PRESSURE: 102 MMHG | DIASTOLIC BLOOD PRESSURE: 58 MMHG | HEIGHT: 65 IN | RESPIRATION RATE: 14 BRPM | WEIGHT: 140 LBS

## 2022-10-03 DIAGNOSIS — R30.0 DYSURIA: Primary | ICD-10-CM

## 2022-10-03 DIAGNOSIS — Z87.442 HISTORY OF RENAL CALCULI: ICD-10-CM

## 2022-10-03 LAB
BILIRUBIN, POC: NEGATIVE
BLOOD URINE, POC: NORMAL
CLARITY, POC: NORMAL
COLOR, POC: NORMAL
GLUCOSE URINE, POC: NEGATIVE
KETONES, POC: NEGATIVE
LEUKOCYTE EST, POC: NORMAL
NITRITE, POC: POSITIVE
PH, POC: 5.5
PROTEIN, POC: NEGATIVE
SPECIFIC GRAVITY, POC: 1.01
UROBILINOGEN, POC: 0.2

## 2022-10-03 PROCEDURE — 81002 URINALYSIS NONAUTO W/O SCOPE: CPT | Performed by: PHYSICIAN ASSISTANT

## 2022-10-03 PROCEDURE — 99213 OFFICE O/P EST LOW 20 MIN: CPT | Performed by: PHYSICIAN ASSISTANT

## 2022-10-03 NOTE — PROGRESS NOTES
[address] MEDICINE  10/03/22     CHIEF COMPLAINT  Chief Complaint   Patient presents with    Follow-up     Patient is here to follow up for UTI on 9/12, she denies any symptoms       HISTORY OF PRESENT  ILLNESS  Neisha Linares is a 50 y.o.  female   recurrent renal calculi and UTI. Resolved after Levaquin. PHYSICAL EXAM     Vitals:    10/03/22 1310   BP: (!) 102/58   Pulse: 69   Resp: 14   Temp: 97.4 °F (36.3 °C)   TempSrc: Temporal   SpO2: 97%   Weight: 140 lb (63.5 kg)   Height: 5' 5\" (1.651 m)      APPEARANCE: Pleasant, friendly, well-nourished. No acute distress. HEENT: Normocephalic, atraumatic. EOMI,PERRLA. , conjunctiva pink /moist, sclera white. Hearing intact. Nose/ Mouth: not examined today-wearing mask  NECK: No lymphadenopathy or masses. Moves neck fully. HEART: Reg rate and rhythm. No murmurs, rubs, or gallops. LUNGS: Clear to auscultation. No wheezes, rales, or rhonchi. ABDOMEN:  Soft, bowel sounds present, non-tender, no masses or organomegaly. BACK/EXTREMITIES: Moves all joints. NEUROLOGIC: Normal gross and sensory exam.  SKIN: Warm, dry, normal turgor. Cap refill <3secs. No rashes, petechiae, purpura. No clubbing, cyanosis or edema. PSYCHIATRIC:  Mood, behavior, and judgement normal. Thought content normal.      Results for POC orders placed in visit on 10/03/22   POCT Urinalysis no Micro   Result Value Ref Range    Color, UA      Clarity, UA      Glucose, UA POC negative     Bilirubin, UA negative     Ketones, UA negative     Spec Grav, UA 1.015     Blood, UA POC small     pH, UA 5.5     Protein, UA POC negative     Urobilinogen, UA 0.2     Leukocytes, UA small     Nitrite, UA positive        IMPRESSION/ PLAN     1. Dysuria    2. History of renal calculi      Sent urine for culture although pt is asymptomatic. Levaquin caused tinnitus.      Follow Up prn

## 2022-10-05 LAB
ORGANISM: ABNORMAL
URINE CULTURE, ROUTINE: ABNORMAL

## 2022-10-05 RX ORDER — SULFAMETHOXAZOLE AND TRIMETHOPRIM 800; 160 MG/1; MG/1
1 TABLET ORAL 2 TIMES DAILY
Qty: 28 TABLET | Refills: 0 | Status: SHIPPED | OUTPATIENT
Start: 2022-10-05 | End: 2022-10-19

## 2022-11-02 ENCOUNTER — OFFICE VISIT (OUTPATIENT)
Dept: FAMILY MEDICINE CLINIC | Age: 52
End: 2022-11-02
Payer: COMMERCIAL

## 2022-11-02 VITALS
HEART RATE: 64 BPM | WEIGHT: 139 LBS | BODY MASS INDEX: 23.16 KG/M2 | SYSTOLIC BLOOD PRESSURE: 102 MMHG | HEIGHT: 65 IN | DIASTOLIC BLOOD PRESSURE: 60 MMHG | OXYGEN SATURATION: 98 % | TEMPERATURE: 96.7 F | RESPIRATION RATE: 14 BRPM

## 2022-11-02 DIAGNOSIS — N39.0 RECURRENT UTI: ICD-10-CM

## 2022-11-02 DIAGNOSIS — N20.0 RECURRENT KIDNEY STONES: ICD-10-CM

## 2022-11-02 DIAGNOSIS — E21.3 HYPERPARATHYROIDISM (HCC): ICD-10-CM

## 2022-11-02 DIAGNOSIS — R35.0 URINARY FREQUENCY: ICD-10-CM

## 2022-11-02 DIAGNOSIS — R30.0 DYSURIA: Primary | ICD-10-CM

## 2022-11-02 DIAGNOSIS — N39.0 ACUTE UTI (URINARY TRACT INFECTION): ICD-10-CM

## 2022-11-02 LAB
BILIRUBIN, POC: NEGATIVE
BLOOD URINE, POC: ABNORMAL
CLARITY, POC: ABNORMAL
COLOR, POC: ABNORMAL
GLUCOSE URINE, POC: NEGATIVE
KETONES, POC: NEGATIVE
LEUKOCYTE EST, POC: ABNORMAL
NITRITE, POC: NEGATIVE
PH, POC: 7
PROTEIN, POC: NEGATIVE
SPECIFIC GRAVITY, POC: 1.01
UROBILINOGEN, POC: 0.2

## 2022-11-02 PROCEDURE — 99214 OFFICE O/P EST MOD 30 MIN: CPT | Performed by: PHYSICIAN ASSISTANT

## 2022-11-02 PROCEDURE — 81002 URINALYSIS NONAUTO W/O SCOPE: CPT | Performed by: PHYSICIAN ASSISTANT

## 2022-11-02 RX ORDER — SULFAMETHOXAZOLE AND TRIMETHOPRIM 800; 160 MG/1; MG/1
1 TABLET ORAL 2 TIMES DAILY
Qty: 10 TABLET | Refills: 0 | Status: SHIPPED | OUTPATIENT
Start: 2022-11-02 | End: 2022-11-02

## 2022-11-02 RX ORDER — SULFAMETHOXAZOLE AND TRIMETHOPRIM 800; 160 MG/1; MG/1
1 TABLET ORAL 2 TIMES DAILY
Qty: 14 TABLET | Refills: 0 | Status: SHIPPED | OUTPATIENT
Start: 2022-11-02 | End: 2022-11-09

## 2022-11-02 ASSESSMENT — PATIENT HEALTH QUESTIONNAIRE - PHQ9
4. FEELING TIRED OR HAVING LITTLE ENERGY: 0
SUM OF ALL RESPONSES TO PHQ QUESTIONS 1-9: 0
SUM OF ALL RESPONSES TO PHQ QUESTIONS 1-9: 0
7. TROUBLE CONCENTRATING ON THINGS, SUCH AS READING THE NEWSPAPER OR WATCHING TELEVISION: 0
2. FEELING DOWN, DEPRESSED OR HOPELESS: 0
SUM OF ALL RESPONSES TO PHQ QUESTIONS 1-9: 0
SUM OF ALL RESPONSES TO PHQ QUESTIONS 1-9: 0
10. IF YOU CHECKED OFF ANY PROBLEMS, HOW DIFFICULT HAVE THESE PROBLEMS MADE IT FOR YOU TO DO YOUR WORK, TAKE CARE OF THINGS AT HOME, OR GET ALONG WITH OTHER PEOPLE: 0
9. THOUGHTS THAT YOU WOULD BE BETTER OFF DEAD, OR OF HURTING YOURSELF: 0
5. POOR APPETITE OR OVEREATING: 0
3. TROUBLE FALLING OR STAYING ASLEEP: 0
1. LITTLE INTEREST OR PLEASURE IN DOING THINGS: 0
SUM OF ALL RESPONSES TO PHQ9 QUESTIONS 1 & 2: 0
8. MOVING OR SPEAKING SO SLOWLY THAT OTHER PEOPLE COULD HAVE NOTICED. OR THE OPPOSITE, BEING SO FIGETY OR RESTLESS THAT YOU HAVE BEEN MOVING AROUND A LOT MORE THAN USUAL: 0
6. FEELING BAD ABOUT YOURSELF - OR THAT YOU ARE A FAILURE OR HAVE LET YOURSELF OR YOUR FAMILY DOWN: 0

## 2022-11-02 NOTE — PROGRESS NOTES
[address] [facility]    CC:   Chief Complaint   Patient presents with    Dysuria     Patient is here with frequent urination, burning with urination and some cramping for 3 days. Urinary Frequency       History of Present Illness:    Nuvia Mckeon is a 50 y.o. female who complains of 3 days of  dysuria, frequency, urgency, burning with urination, thinks she passes a stone and now w UTI symptoms Does have a history of recurrent renal calculi. Pain improved and no longer cramping. Denies: Fever, chills, flank or suprapubic pains. No nausea. Appetite unchanged. Denies GYN  or GI complaints including abnormal vaginal discharge or vaginal bleeding . No bowel habit changes, melena, hematochezia. Physical exam:      Vitals:    11/02/22 1509   BP: 102/60   Pulse: 64   Resp: 14   Temp: (!) 96.7 °F (35.9 °C)   TempSrc: Temporal   SpO2: 98%   Weight: 139 lb (63 kg)   Height: 5' 5\" (1.651 m)     APPEARANCE:    No acute distress. NECK: no masses, supple  HEART: Reg rate and rhythm. No murmurs, rubs, or gallops. LUNGS: Clear to auscultation. No wheezes, rales, or rhonchi. ABDOMEN:  Soft, bowel sounds present,  non-tender, no masses or HSM. No CVAT. NEUROLOGIC: grossly non focal  SKIN: Warm, dry, normal turgor. Cap refill <3secs. No rashes, petechiae, purpura. Laboratory:   Results for POC orders placed in visit on 11/02/22   POCT Urinalysis no Micro   Result Value Ref Range    Color, UA      Clarity, UA      Glucose, UA POC negative     Bilirubin, UA negative     Ketones, UA negative     Spec Grav, UA 1.010     Blood, UA POC small     pH, UA 7.0     Protein, UA POC negative     Urobilinogen, UA 0.2     Leukocytes, UA moderate     Nitrite, UA negative       Urine culture Indicated/ordered       Assessment/ Plan      1. Dysuria    2. Acute UTI (urinary tract infection)    3. Recurrent kidney stones    4. Recurrent UTI    5. Urinary frequency    6.  Hyperparathyroidism ([address] Increase fluids. Recurrent GT 3 /yr. Advised to make appointment w Urologist. Will also recheck PTH- I as only removed one gland.      Orders Placed This Encounter   Medications    DISCONTD: sulfamethoxazole-trimethoprim (BACTRIM DS;SEPTRA DS) 800-160 MG per tablet     Sig: Take 1 tablet by mouth 2 times daily for 5 days     Dispense:  10 tablet     Refill:  0    sulfamethoxazole-trimethoprim (BACTRIM DS;SEPTRA DS) 800-160 MG per tablet     Sig: Take 1 tablet by mouth 2 times daily for 7 days     Dispense:  14 tablet     Refill:  0

## 2022-11-03 LAB
ANION GAP SERPL CALCULATED.3IONS-SCNC: 13 MMOL/L (ref 3–16)
BASOPHILS ABSOLUTE: 0.1 K/UL (ref 0–0.2)
BASOPHILS RELATIVE PERCENT: 1 %
BUN BLDV-MCNC: 13 MG/DL (ref 7–20)
CALCIUM SERPL-MCNC: 9.4 MG/DL (ref 8.3–10.6)
CHLORIDE BLD-SCNC: 103 MMOL/L (ref 99–110)
CO2: 25 MMOL/L (ref 21–32)
CREAT SERPL-MCNC: 0.8 MG/DL (ref 0.6–1.1)
EOSINOPHILS ABSOLUTE: 0.1 K/UL (ref 0–0.6)
EOSINOPHILS RELATIVE PERCENT: 2.6 %
GFR SERPL CREATININE-BSD FRML MDRD: >60 ML/MIN/{1.73_M2}
GLUCOSE BLD-MCNC: 88 MG/DL (ref 70–99)
HCT VFR BLD CALC: 37.9 % (ref 36–48)
HEMOGLOBIN: 12.6 G/DL (ref 12–16)
LYMPHOCYTES ABSOLUTE: 1.6 K/UL (ref 1–5.1)
LYMPHOCYTES RELATIVE PERCENT: 28.5 %
MCH RBC QN AUTO: 29.4 PG (ref 26–34)
MCHC RBC AUTO-ENTMCNC: 33.3 G/DL (ref 31–36)
MCV RBC AUTO: 88.3 FL (ref 80–100)
MONOCYTES ABSOLUTE: 0.4 K/UL (ref 0–1.3)
MONOCYTES RELATIVE PERCENT: 6.8 %
NEUTROPHILS ABSOLUTE: 3.4 K/UL (ref 1.7–7.7)
NEUTROPHILS RELATIVE PERCENT: 61.1 %
PARATHYROID HORMONE INTACT: 42.5 PG/ML (ref 14–72)
PDW BLD-RTO: 14.2 % (ref 12.4–15.4)
PLATELET # BLD: 242 K/UL (ref 135–450)
PMV BLD AUTO: 9.5 FL (ref 5–10.5)
POTASSIUM SERPL-SCNC: 4 MMOL/L (ref 3.5–5.1)
RBC # BLD: 4.29 M/UL (ref 4–5.2)
SODIUM BLD-SCNC: 141 MMOL/L (ref 136–145)
WBC # BLD: 5.5 K/UL (ref 4–11)

## 2022-11-05 LAB
ORGANISM: ABNORMAL
URINE CULTURE, ROUTINE: ABNORMAL

## 2022-12-16 ENCOUNTER — OFFICE VISIT (OUTPATIENT)
Dept: FAMILY MEDICINE CLINIC | Age: 52
End: 2022-12-16

## 2022-12-16 VITALS
TEMPERATURE: 97.7 F | SYSTOLIC BLOOD PRESSURE: 102 MMHG | OXYGEN SATURATION: 98 % | WEIGHT: 142 LBS | DIASTOLIC BLOOD PRESSURE: 68 MMHG | HEART RATE: 68 BPM | BODY MASS INDEX: 23.63 KG/M2

## 2022-12-16 DIAGNOSIS — R30.0 DYSURIA: Primary | ICD-10-CM

## 2022-12-16 LAB
BILIRUBIN, POC: NEGATIVE
BLOOD URINE, POC: ABNORMAL
CLARITY, POC: ABNORMAL
COLOR, POC: ABNORMAL
GLUCOSE URINE, POC: NEGATIVE
KETONES, POC: NEGATIVE
LEUKOCYTE EST, POC: ABNORMAL
NITRITE, POC: NEGATIVE
PH, POC: 6
PROTEIN, POC: NEGATIVE
SPECIFIC GRAVITY, POC: 1.01
UROBILINOGEN, POC: 0.2

## 2022-12-16 RX ORDER — CIPROFLOXACIN 500 MG/1
500 TABLET, FILM COATED ORAL 2 TIMES DAILY
Qty: 14 TABLET | Refills: 0 | Status: SHIPPED | OUTPATIENT
Start: 2022-12-16 | End: 2022-12-23

## 2022-12-18 LAB
ORGANISM: ABNORMAL
URINE CULTURE, ROUTINE: ABNORMAL

## 2023-12-28 ENCOUNTER — OFFICE VISIT (OUTPATIENT)
Dept: FAMILY MEDICINE CLINIC | Age: 53
End: 2023-12-28
Payer: COMMERCIAL

## 2023-12-28 VITALS
RESPIRATION RATE: 16 BRPM | TEMPERATURE: 97.7 F | SYSTOLIC BLOOD PRESSURE: 98 MMHG | BODY MASS INDEX: 24.19 KG/M2 | OXYGEN SATURATION: 97 % | DIASTOLIC BLOOD PRESSURE: 60 MMHG | HEART RATE: 65 BPM | WEIGHT: 145.2 LBS | HEIGHT: 65 IN

## 2023-12-28 DIAGNOSIS — Z00.00 ANNUAL PHYSICAL EXAM: Primary | ICD-10-CM

## 2023-12-28 DIAGNOSIS — N95.1 MENOPAUSAL SYMPTOM: ICD-10-CM

## 2023-12-28 DIAGNOSIS — E04.1 NODULE OF RIGHT LOBE OF THYROID GLAND: ICD-10-CM

## 2023-12-28 DIAGNOSIS — R53.81 MALAISE AND FATIGUE: ICD-10-CM

## 2023-12-28 DIAGNOSIS — E55.9 VITAMIN D DEFICIENCY: ICD-10-CM

## 2023-12-28 DIAGNOSIS — R25.2 LEG CRAMPS: ICD-10-CM

## 2023-12-28 DIAGNOSIS — R53.83 MALAISE AND FATIGUE: ICD-10-CM

## 2023-12-28 DIAGNOSIS — E21.3 HYPERPARATHYROIDISM (HCC): ICD-10-CM

## 2023-12-28 PROBLEM — N39.0 URINARY TRACT INFECTIOUS DISEASE: Status: ACTIVE | Noted: 2023-12-28

## 2023-12-28 LAB
25(OH)D3 SERPL-MCNC: 55.2 NG/ML
ALBUMIN SERPL-MCNC: 4.7 G/DL (ref 3.4–5)
ALBUMIN/GLOB SERPL: 1.9 {RATIO} (ref 1.1–2.2)
ALP SERPL-CCNC: 84 U/L (ref 40–129)
ALT SERPL-CCNC: 15 U/L (ref 10–40)
ANION GAP SERPL CALCULATED.3IONS-SCNC: 9 MMOL/L (ref 3–16)
AST SERPL-CCNC: 25 U/L (ref 15–37)
BASOPHILS # BLD: 0 K/UL (ref 0–0.2)
BASOPHILS NFR BLD: 1 %
BILIRUB SERPL-MCNC: 0.5 MG/DL (ref 0–1)
BUN SERPL-MCNC: 15 MG/DL (ref 7–20)
CALCIUM SERPL-MCNC: 9.6 MG/DL (ref 8.3–10.6)
CHLORIDE SERPL-SCNC: 103 MMOL/L (ref 99–110)
CHOLEST SERPL-MCNC: 193 MG/DL (ref 0–199)
CO2 SERPL-SCNC: 29 MMOL/L (ref 21–32)
CREAT SERPL-MCNC: 0.7 MG/DL (ref 0.6–1.1)
DEPRECATED RDW RBC AUTO: 13 % (ref 12.4–15.4)
EOSINOPHIL # BLD: 0.2 K/UL (ref 0–0.6)
EOSINOPHIL NFR BLD: 3.5 %
GFR SERPLBLD CREATININE-BSD FMLA CKD-EPI: >60 ML/MIN/{1.73_M2}
GLUCOSE SERPL-MCNC: 86 MG/DL (ref 70–99)
HCT VFR BLD AUTO: 42 % (ref 36–48)
HDLC SERPL-MCNC: 52 MG/DL (ref 40–60)
HGB BLD-MCNC: 14.1 G/DL (ref 12–16)
LDLC SERPL CALC-MCNC: 122 MG/DL
LYMPHOCYTES # BLD: 1.4 K/UL (ref 1–5.1)
LYMPHOCYTES NFR BLD: 32.3 %
MCH RBC QN AUTO: 29.8 PG (ref 26–34)
MCHC RBC AUTO-ENTMCNC: 33.6 G/DL (ref 31–36)
MCV RBC AUTO: 88.9 FL (ref 80–100)
MONOCYTES # BLD: 0.4 K/UL (ref 0–1.3)
MONOCYTES NFR BLD: 8.6 %
NEUTROPHILS # BLD: 2.4 K/UL (ref 1.7–7.7)
NEUTROPHILS NFR BLD: 54.6 %
PLATELET # BLD AUTO: 246 K/UL (ref 135–450)
PMV BLD AUTO: 10.2 FL (ref 5–10.5)
POTASSIUM SERPL-SCNC: 4.4 MMOL/L (ref 3.5–5.1)
PROT SERPL-MCNC: 7.2 G/DL (ref 6.4–8.2)
RBC # BLD AUTO: 4.73 M/UL (ref 4–5.2)
SODIUM SERPL-SCNC: 141 MMOL/L (ref 136–145)
TRIGL SERPL-MCNC: 95 MG/DL (ref 0–150)
TSH SERPL DL<=0.005 MIU/L-ACNC: 1.81 UIU/ML (ref 0.27–4.2)
VLDLC SERPL CALC-MCNC: 19 MG/DL
WBC # BLD AUTO: 4.5 K/UL (ref 4–11)

## 2023-12-28 PROCEDURE — 99214 OFFICE O/P EST MOD 30 MIN: CPT | Performed by: PHYSICIAN ASSISTANT

## 2023-12-28 PROCEDURE — 99396 PREV VISIT EST AGE 40-64: CPT | Performed by: PHYSICIAN ASSISTANT

## 2023-12-28 PROCEDURE — 36415 COLL VENOUS BLD VENIPUNCTURE: CPT | Performed by: PHYSICIAN ASSISTANT

## 2023-12-28 RX ORDER — TRIMETHOPRIM 100 MG/1
1 TABLET ORAL DAILY
COMMUNITY
Start: 2023-10-15

## 2023-12-28 RX ORDER — LANOLIN ALCOHOL/MO/W.PET/CERES
2500 CREAM (GRAM) TOPICAL DAILY
COMMUNITY

## 2023-12-28 NOTE — PROGRESS NOTES
Without scars. Soft, non-tender, normoactive bowel sounds. No pulsatile masses or hepatosplenomegaly. EXTREMITIES: No skin or bony deformities. Symmetrical strength. Full range of motion without eliciting pain. Sensation and DTR's are equal and intact. NEUROLOGIC: Grossly non focal. Cranial Nerves II-XII intact. SKIN: Warm, dry and intact. Healing dry patches on antecubitals. No petechia, purpura. No suspicious lesions. No nail clubbing or cyanosis or edema. PSYCHIATRIC:  Mood, behavior, and judgement normal. Thought content normal.      ADDITIONAL DATA:  Prior clinic visit notes, labs, and imaging reports were reviewed. Orders Placed This Encounter   Procedures    US THYROID    Comprehensive Metabolic Panel    Lipid Panel    CBC with Auto Differential    TSH with Reflex to FT4    Vitamin B12 & Folate    Vitamin D 25 Hydroxy    [facility] Jennifer Diallo MD, Endocrinology, [facility]        ASSESSMENT & PLAN:   Annual physical exam  - Medical records updated. Healthy living recommendations discussed. Malaise and fatigue  Nodule of right lobe of thyroid gland  - noticed one month ago. With systemic changes however in perimenopausal state. LMP 5 mo ago. - Check labs and adjust treatment as needed. -     US THYROID; Future  -     [address] [facility] Sarah Martinez MD, Endocrinology, [facility]    Menopausal symptom  -    scheduled to see GYN Feb 2024 to further discuss. Hyperparathyroidism ([address])  -   s/p surgery    Vitamin D deficiency  -    on supplement, not tested since 2018  Joy Vitamin D 25 Hydroxy    Leg cramps  Resolved with increased vit B12. Ck levels due duet to dosage. Recommended is 500-2000mcg per day.      Follow Up for yearly PE and 1 mo for thyroid    Electronically signed by HOPE Edwards on 12/28/2023 at 8:39 AM

## 2023-12-29 DIAGNOSIS — R74.8 ELEVATED VITAMIN B12 LEVEL: Primary | ICD-10-CM

## 2023-12-29 LAB
FOLATE SERPL-MCNC: >20 NG/ML (ref 4.78–24.2)
VIT B12 SERPL-MCNC: 1677 PG/ML (ref 211–911)

## 2024-01-04 ENCOUNTER — HOSPITAL ENCOUNTER (OUTPATIENT)
Dept: ULTRASOUND IMAGING | Age: 54
Discharge: HOME | End: 2024-01-04
Payer: COMMERCIAL

## 2024-01-04 DIAGNOSIS — E21.3 HYPERPARATHYROIDISM (HCC): ICD-10-CM

## 2024-01-04 DIAGNOSIS — E04.1 NODULE OF RIGHT LOBE OF THYROID GLAND: Primary | ICD-10-CM

## 2024-01-04 DIAGNOSIS — E04.1 NODULE OF RIGHT LOBE OF THYROID GLAND: ICD-10-CM

## 2024-01-04 DIAGNOSIS — R53.81 MALAISE AND FATIGUE: ICD-10-CM

## 2024-01-04 DIAGNOSIS — E04.1 THYROID NODULE: ICD-10-CM

## 2024-01-04 DIAGNOSIS — R53.83 MALAISE AND FATIGUE: ICD-10-CM

## 2024-01-04 PROCEDURE — 76536 US EXAM OF HEAD AND NECK: CPT

## 2024-01-05 DIAGNOSIS — E21.3 HYPERPARATHYROIDISM (HCC): ICD-10-CM

## 2024-01-05 DIAGNOSIS — E04.1 NODULE OF RIGHT LOBE OF THYROID GLAND: Primary | ICD-10-CM

## 2024-01-16 ENCOUNTER — OFFICE VISIT (OUTPATIENT)
Dept: ENT CLINIC | Age: 54
End: 2024-01-16

## 2024-01-16 VITALS
RESPIRATION RATE: 16 BRPM | DIASTOLIC BLOOD PRESSURE: 74 MMHG | SYSTOLIC BLOOD PRESSURE: 121 MMHG | BODY MASS INDEX: 24.16 KG/M2 | HEART RATE: 55 BPM | HEIGHT: 65 IN | WEIGHT: 145 LBS

## 2024-01-16 DIAGNOSIS — E04.1 THYROID NODULE: Primary | ICD-10-CM

## 2024-01-16 ASSESSMENT — ENCOUNTER SYMPTOMS
FACIAL SWELLING: 0
EYE ITCHING: 0
SORE THROAT: 0
COUGH: 0
SINUS PRESSURE: 0
SHORTNESS OF BREATH: 0
VOICE CHANGE: 0
TROUBLE SWALLOWING: 0
APNEA: 0

## 2024-01-16 NOTE — PROGRESS NOTES
the skin was prepped and draped in usual sterile fashion.  Location: Right inferior  Needle(s) 22 Ga     Clinical information  54-year-old female with TR three 2.6 cm nodule    Comparison  Ultrasound 1/4/2024    Findings  Number of passes: 3 at each site   Adequacy: Visual confirmation on US. Cytopathology to determine adequacy upon review of specimen.   Complications: None.     Impression  Ultrasound-guided fine needle aspiration of thyroid nodule(s).       Assessment and Plan     1. Thyroid nodule  -Discussed potential for nondiagnostic biopsy  -Biopsy performed today without complication  -Will call with results  - Cytology, Non-Gyn      No follow-ups on file.      Stephanie Peace,   1/16/24      Portions of this note were dictated using Dragon. There may be linguistic errors secondary to the use of this program.

## 2024-01-23 ENCOUNTER — OFFICE VISIT (OUTPATIENT)
Dept: ENDOCRINOLOGY | Age: 54
End: 2024-01-23
Payer: COMMERCIAL

## 2024-01-23 VITALS
WEIGHT: 144.8 LBS | HEIGHT: 65 IN | RESPIRATION RATE: 16 BRPM | BODY MASS INDEX: 24.12 KG/M2 | SYSTOLIC BLOOD PRESSURE: 117 MMHG | DIASTOLIC BLOOD PRESSURE: 77 MMHG | HEART RATE: 67 BPM

## 2024-01-23 DIAGNOSIS — E04.1 NODULE OF RIGHT LOBE OF THYROID GLAND: Primary | ICD-10-CM

## 2024-01-23 DIAGNOSIS — E21.3 HYPERPARATHYROIDISM (HCC): ICD-10-CM

## 2024-01-23 DIAGNOSIS — N95.1 PERIMENOPAUSE: ICD-10-CM

## 2024-01-23 PROCEDURE — 99204 OFFICE O/P NEW MOD 45 MIN: CPT | Performed by: INTERNAL MEDICINE

## 2024-01-23 NOTE — PROGRESS NOTES
[facility] Endocrinology  Initial Patient Visit        Patient:  Santa Restrepo                                               : 1970    MRN: [id number]  Date : 2024      CHIEF COMPLAINT:   Chief Complaint   Patient presents with    New Patient     Hyperparathyroidism, thyroid nodule, menopause symptoms.         HISTORY OF PRESENT ILLNESS:   Santa Restrepo is a pleasant 54 y.o. female who presents for evaluation of thyroid nodule, menopause symptoms.  She has history of primary hyperparathyroidism status post single parathyroid adenoma resection in  with no recurrence.    Around 2023, patient noticed multiple symptoms including feet pain, tongue numbness, development of intermittent hives, memory disturbances as well as temperature dysregulation's.  As part of further evaluation, CMP was normal including calcium and kidney function, TSH was normal.  Thyroid ultrasound revealed right-sided thyroid nodule that met criteria for biopsy.  FNA completed by ENT last week was benign.  Patient is here for evaluation and wonders if her symptoms might be related to thyroid.    She denies any family history of thyroid cancer.  No prior history of head/neck radiation.  No difficulty with swallowing or breathing.  Her last cycle was around 2023 and prior to that was 10 months earlier.  She reports that she is in the process of going through menopause as she had noticed changes in memory and can easily make simple mistakes at work which is not common for her.  She also had noticed temperature dysregulation.  She had been prescribed estrogen cream few years ago due to recurrent UTI symptoms.  Patient reports that she gained few pounds over the past few years.  No changes in appetite.  She is mainly vegetarian.    Patient is a psychiatrist.  No smoking, alcohol or illicit drug use.  She reports family history of breast cancer and  mother as well as blood cancer.  Patient herself does not have any

## 2024-02-27 LAB
HPV, INTERPRETATION: NEGATIVE
PAP SMEAR: NORMAL
SEX HORMONE BINDING GLOBULIN: NORMAL
TESTOSTERONE FREE: 0.5
TESTOSTERONE, FEMALES/CHILDREN: <3

## 2024-04-10 ENCOUNTER — HOSPITAL ENCOUNTER (OUTPATIENT)
Dept: WOMENS IMAGING | Age: 54
Discharge: HOME | End: 2024-04-10
Payer: COMMERCIAL

## 2024-04-10 DIAGNOSIS — M85.88 OTHER SPECIFIED DISORDERS OF BONE DENSITY AND STRUCTURE, OTHER SITE: ICD-10-CM

## 2024-04-10 DIAGNOSIS — M85.89 OTHER SPECIFIED DISORDERS OF BONE DENSITY AND STRUCTURE, MULTIPLE SITES: ICD-10-CM

## 2024-04-10 PROCEDURE — 77080 DXA BONE DENSITY AXIAL: CPT

## 2024-10-11 ENCOUNTER — APPOINTMENT (OUTPATIENT)
Dept: CT IMAGING | Age: 54
DRG: 195 | End: 2024-10-11
Payer: COMMERCIAL

## 2024-10-11 ENCOUNTER — HOSPITAL ENCOUNTER (INPATIENT)
Age: 54
LOS: 4 days | Discharge: HOME | DRG: 195 | End: 2024-10-15
Attending: EMERGENCY MEDICINE | Admitting: INTERNAL MEDICINE
Payer: COMMERCIAL

## 2024-10-11 DIAGNOSIS — R09.02 HYPOXIA: ICD-10-CM

## 2024-10-11 DIAGNOSIS — J18.9 ATYPICAL PNEUMONIA: Primary | ICD-10-CM

## 2024-10-11 PROBLEM — J15.9 PNEUMONIA DUE TO GRAM-POSITIVE BACTERIA: Status: ACTIVE | Noted: 2024-10-11

## 2024-10-11 LAB
ALBUMIN SERPL-MCNC: 3.8 G/DL (ref 3.4–5)
ALBUMIN/GLOB SERPL: 1.1 {RATIO} (ref 1.1–2.2)
ALP SERPL-CCNC: 93 U/L (ref 40–129)
ALT SERPL-CCNC: 16 U/L (ref 10–40)
ANION GAP SERPL CALCULATED.3IONS-SCNC: 11 MMOL/L (ref 3–16)
AST SERPL-CCNC: 19 U/L (ref 15–37)
BASOPHILS # BLD: 0.1 K/UL (ref 0–0.2)
BASOPHILS NFR BLD: 0.7 %
BILIRUB SERPL-MCNC: 0.4 MG/DL (ref 0–1)
BUN SERPL-MCNC: 11 MG/DL (ref 7–20)
CALCIUM SERPL-MCNC: 9.4 MG/DL (ref 8.3–10.6)
CHLORIDE SERPL-SCNC: 101 MMOL/L (ref 99–110)
CO2 SERPL-SCNC: 24 MMOL/L (ref 21–32)
CREAT SERPL-MCNC: 0.9 MG/DL (ref 0.6–1.1)
DEPRECATED RDW RBC AUTO: 13.1 % (ref 12.4–15.4)
EKG ATRIAL RATE: 96 BPM
EKG DIAGNOSIS: NORMAL
EKG P AXIS: 64 DEGREES
EKG P-R INTERVAL: 128 MS
EKG Q-T INTERVAL: 326 MS
EKG QRS DURATION: 80 MS
EKG QTC CALCULATION (BAZETT): 411 MS
EKG R AXIS: 73 DEGREES
EKG T AXIS: 56 DEGREES
EKG VENTRICULAR RATE: 96 BPM
EOSINOPHIL # BLD: 0.3 K/UL (ref 0–0.6)
EOSINOPHIL NFR BLD: 3 %
GFR SERPLBLD CREATININE-BSD FMLA CKD-EPI: 77 ML/MIN/{1.73_M2}
GLUCOSE SERPL-MCNC: 101 MG/DL (ref 70–99)
HCG SERPL QL: NEGATIVE
HCT VFR BLD AUTO: 38.4 % (ref 36–48)
HGB BLD-MCNC: 12.5 G/DL (ref 12–16)
LACTATE BLDV-SCNC: 1 MMOL/L (ref 0.4–2)
LYMPHOCYTES # BLD: 1.3 K/UL (ref 1–5.1)
LYMPHOCYTES NFR BLD: 11.5 %
MCH RBC QN AUTO: 28.5 PG (ref 26–34)
MCHC RBC AUTO-ENTMCNC: 32.6 G/DL (ref 31–36)
MCV RBC AUTO: 87.2 FL (ref 80–100)
MONOCYTES # BLD: 0.9 K/UL (ref 0–1.3)
MONOCYTES NFR BLD: 7.8 %
NEUTROPHILS # BLD: 8.6 K/UL (ref 1.7–7.7)
NEUTROPHILS NFR BLD: 77 %
PLATELET # BLD AUTO: 331 K/UL (ref 135–450)
PMV BLD AUTO: 9.3 FL (ref 5–10.5)
POTASSIUM SERPL-SCNC: 3.8 MMOL/L (ref 3.5–5.1)
PROCALCITONIN SERPL IA-MCNC: 0.04 NG/ML (ref 0–0.15)
PROT SERPL-MCNC: 7.4 G/DL (ref 6.4–8.2)
RBC # BLD AUTO: 4.4 M/UL (ref 4–5.2)
SODIUM SERPL-SCNC: 136 MMOL/L (ref 136–145)
WBC # BLD AUTO: 11.1 K/UL (ref 4–11)

## 2024-10-11 PROCEDURE — 83605 ASSAY OF LACTIC ACID: CPT

## 2024-10-11 PROCEDURE — 6370000000 HC RX 637 (ALT 250 FOR IP): Performed by: INTERNAL MEDICINE

## 2024-10-11 PROCEDURE — 6360000002 HC RX W HCPCS

## 2024-10-11 PROCEDURE — 2500000003 HC RX 250 WO HCPCS: Performed by: INTERNAL MEDICINE

## 2024-10-11 PROCEDURE — 0202U NFCT DS 22 TRGT SARS-COV-2: CPT

## 2024-10-11 PROCEDURE — 2580000003 HC RX 258

## 2024-10-11 PROCEDURE — 87070 CULTURE OTHR SPECIMN AEROBIC: CPT

## 2024-10-11 PROCEDURE — 99285 EMERGENCY DEPT VISIT HI MDM: CPT

## 2024-10-11 PROCEDURE — 93005 ELECTROCARDIOGRAM TRACING: CPT | Performed by: EMERGENCY MEDICINE

## 2024-10-11 PROCEDURE — 94761 N-INVAS EAR/PLS OXIMETRY MLT: CPT

## 2024-10-11 PROCEDURE — 93010 ELECTROCARDIOGRAM REPORT: CPT | Performed by: INTERNAL MEDICINE

## 2024-10-11 PROCEDURE — 87040 BLOOD CULTURE FOR BACTERIA: CPT

## 2024-10-11 PROCEDURE — 87449 NOS EACH ORGANISM AG IA: CPT

## 2024-10-11 PROCEDURE — 71260 CT THORAX DX C+: CPT

## 2024-10-11 PROCEDURE — 2700000000 HC OXYGEN THERAPY PER DAY

## 2024-10-11 PROCEDURE — 2580000003 HC RX 258: Performed by: INTERNAL MEDICINE

## 2024-10-11 PROCEDURE — 85025 COMPLETE CBC W/AUTO DIFF WBC: CPT

## 2024-10-11 PROCEDURE — 99222 1ST HOSP IP/OBS MODERATE 55: CPT | Performed by: INTERNAL MEDICINE

## 2024-10-11 PROCEDURE — 6360000002 HC RX W HCPCS: Performed by: INTERNAL MEDICINE

## 2024-10-11 PROCEDURE — 84145 PROCALCITONIN (PCT): CPT

## 2024-10-11 PROCEDURE — 84703 CHORIONIC GONADOTROPIN ASSAY: CPT

## 2024-10-11 PROCEDURE — 1200000000 HC SEMI PRIVATE

## 2024-10-11 PROCEDURE — 87205 SMEAR GRAM STAIN: CPT

## 2024-10-11 PROCEDURE — 6360000004 HC RX CONTRAST MEDICATION

## 2024-10-11 PROCEDURE — 80053 COMPREHEN METABOLIC PANEL: CPT

## 2024-10-11 RX ORDER — VANCOMYCIN 1.5 G/300ML
1500 INJECTION, SOLUTION INTRAVENOUS ONCE
Status: COMPLETED | OUTPATIENT
Start: 2024-10-11 | End: 2024-10-11

## 2024-10-11 RX ORDER — VANCOMYCIN 1.5 G/300ML
25 INJECTION, SOLUTION INTRAVENOUS ONCE
Status: DISCONTINUED | OUTPATIENT
Start: 2024-10-11 | End: 2024-10-11 | Stop reason: SDUPTHER

## 2024-10-11 RX ORDER — GUAIFENESIN 200 MG/10ML
200 LIQUID ORAL EVERY 4 HOURS PRN
Status: DISCONTINUED | OUTPATIENT
Start: 2024-10-11 | End: 2024-10-15 | Stop reason: HOSPADM

## 2024-10-11 RX ORDER — ONDANSETRON 4 MG/1
4 TABLET, ORALLY DISINTEGRATING ORAL EVERY 8 HOURS PRN
Status: DISCONTINUED | OUTPATIENT
Start: 2024-10-11 | End: 2024-10-15 | Stop reason: HOSPADM

## 2024-10-11 RX ORDER — PROGESTERONE 100 MG/1
100 CAPSULE ORAL DAILY
COMMUNITY

## 2024-10-11 RX ORDER — SODIUM CHLORIDE 9 MG/ML
INJECTION, SOLUTION INTRAVENOUS PRN
Status: DISCONTINUED | OUTPATIENT
Start: 2024-10-11 | End: 2024-10-15 | Stop reason: HOSPADM

## 2024-10-11 RX ORDER — ACETAMINOPHEN 325 MG/1
650 TABLET ORAL EVERY 6 HOURS PRN
Status: DISCONTINUED | OUTPATIENT
Start: 2024-10-11 | End: 2024-10-15 | Stop reason: HOSPADM

## 2024-10-11 RX ORDER — PROGESTERONE 100 MG/1
100 CAPSULE ORAL NIGHTLY
Status: DISCONTINUED | OUTPATIENT
Start: 2024-10-11 | End: 2024-10-15 | Stop reason: HOSPADM

## 2024-10-11 RX ORDER — VANCOMYCIN 1 G/200ML
1000 INJECTION, SOLUTION INTRAVENOUS EVERY 12 HOURS
Status: DISCONTINUED | OUTPATIENT
Start: 2024-10-12 | End: 2024-10-12

## 2024-10-11 RX ORDER — TRETINOIN 0.25 MG/G
CREAM TOPICAL EVERY OTHER DAY
Status: DISCONTINUED | OUTPATIENT
Start: 2024-10-12 | End: 2024-10-15 | Stop reason: HOSPADM

## 2024-10-11 RX ORDER — SODIUM CHLORIDE 0.9 % (FLUSH) 0.9 %
5-40 SYRINGE (ML) INJECTION EVERY 12 HOURS SCHEDULED
Status: DISCONTINUED | OUTPATIENT
Start: 2024-10-11 | End: 2024-10-15 | Stop reason: HOSPADM

## 2024-10-11 RX ORDER — ENOXAPARIN SODIUM 100 MG/ML
40 INJECTION SUBCUTANEOUS DAILY
Status: DISCONTINUED | OUTPATIENT
Start: 2024-10-11 | End: 2024-10-15 | Stop reason: HOSPADM

## 2024-10-11 RX ORDER — POLYETHYLENE GLYCOL 3350 17 G/17G
17 POWDER, FOR SOLUTION ORAL DAILY PRN
Status: DISCONTINUED | OUTPATIENT
Start: 2024-10-11 | End: 2024-10-15 | Stop reason: HOSPADM

## 2024-10-11 RX ORDER — ONDANSETRON 2 MG/ML
4 INJECTION INTRAMUSCULAR; INTRAVENOUS EVERY 6 HOURS PRN
Status: DISCONTINUED | OUTPATIENT
Start: 2024-10-11 | End: 2024-10-15 | Stop reason: HOSPADM

## 2024-10-11 RX ORDER — IOPAMIDOL 755 MG/ML
75 INJECTION, SOLUTION INTRAVASCULAR
Status: COMPLETED | OUTPATIENT
Start: 2024-10-11 | End: 2024-10-11

## 2024-10-11 RX ORDER — VANCOMYCIN 1 G/200ML
15 INJECTION, SOLUTION INTRAVENOUS EVERY 12 HOURS
Status: DISCONTINUED | OUTPATIENT
Start: 2024-10-11 | End: 2024-10-11 | Stop reason: SDUPTHER

## 2024-10-11 RX ORDER — SODIUM CHLORIDE 0.9 % (FLUSH) 0.9 %
5-40 SYRINGE (ML) INJECTION PRN
Status: DISCONTINUED | OUTPATIENT
Start: 2024-10-11 | End: 2024-10-15 | Stop reason: HOSPADM

## 2024-10-11 RX ORDER — KETOROLAC TROMETHAMINE 30 MG/ML
15 INJECTION, SOLUTION INTRAMUSCULAR; INTRAVENOUS ONCE
Status: COMPLETED | OUTPATIENT
Start: 2024-10-11 | End: 2024-10-11

## 2024-10-11 RX ORDER — LACTOBACILLUS RHAMNOSUS GG 10B CELL
1 CAPSULE ORAL 2 TIMES DAILY WITH MEALS
Status: DISCONTINUED | OUTPATIENT
Start: 2024-10-11 | End: 2024-10-15 | Stop reason: HOSPADM

## 2024-10-11 RX ORDER — BENZONATATE 100 MG/1
100 CAPSULE ORAL 3 TIMES DAILY PRN
Status: DISCONTINUED | OUTPATIENT
Start: 2024-10-11 | End: 2024-10-15 | Stop reason: HOSPADM

## 2024-10-11 RX ORDER — ACETAMINOPHEN 650 MG/1
650 SUPPOSITORY RECTAL EVERY 6 HOURS PRN
Status: DISCONTINUED | OUTPATIENT
Start: 2024-10-11 | End: 2024-10-15 | Stop reason: HOSPADM

## 2024-10-11 RX ORDER — ESTRADIOL 0.1 MG/D
1 PATCH TRANSDERMAL
Status: DISCONTINUED | OUTPATIENT
Start: 2024-10-15 | End: 2024-10-15 | Stop reason: HOSPADM

## 2024-10-11 RX ADMIN — PROGESTERONE 100 MG: 100 CAPSULE ORAL at 21:50

## 2024-10-11 RX ADMIN — SODIUM CHLORIDE 20 ML/HR: 9 INJECTION, SOLUTION INTRAVENOUS at 19:42

## 2024-10-11 RX ADMIN — VANCOMYCIN 1500 MG: 1.5 INJECTION, SOLUTION INTRAVENOUS at 11:58

## 2024-10-11 RX ADMIN — Medication 1 CAPSULE: at 16:27

## 2024-10-11 RX ADMIN — KETOROLAC TROMETHAMINE 15 MG: 30 INJECTION, SOLUTION INTRAMUSCULAR at 11:40

## 2024-10-11 RX ADMIN — GUAIFENESIN 200 MG: 200 SOLUTION ORAL at 19:48

## 2024-10-11 RX ADMIN — IOPAMIDOL 75 ML: 755 INJECTION, SOLUTION INTRAVENOUS at 09:57

## 2024-10-11 RX ADMIN — CEFEPIME 2000 MG: 2 INJECTION, POWDER, FOR SOLUTION INTRAVENOUS at 19:45

## 2024-10-11 RX ADMIN — ACETAMINOPHEN 650 MG: 325 TABLET ORAL at 16:26

## 2024-10-11 RX ADMIN — ENOXAPARIN SODIUM 40 MG: 100 INJECTION SUBCUTANEOUS at 16:26

## 2024-10-11 RX ADMIN — CEFEPIME 2000 MG: 2 INJECTION, POWDER, FOR SOLUTION INTRAVENOUS at 11:13

## 2024-10-11 RX ADMIN — ACETAMINOPHEN 650 MG: 325 TABLET ORAL at 21:49

## 2024-10-11 ASSESSMENT — PAIN SCALES - GENERAL
PAINLEVEL_OUTOF10: 3
PAINLEVEL_OUTOF10: 3
PAINLEVEL_OUTOF10: 5
PAINLEVEL_OUTOF10: 4

## 2024-10-11 ASSESSMENT — PAIN DESCRIPTION - LOCATION
LOCATION: HEAD
LOCATION: CHEST

## 2024-10-11 ASSESSMENT — LIFESTYLE VARIABLES
HOW MANY STANDARD DRINKS CONTAINING ALCOHOL DO YOU HAVE ON A TYPICAL DAY: PATIENT DOES NOT DRINK
HOW OFTEN DO YOU HAVE A DRINK CONTAINING ALCOHOL: NEVER

## 2024-10-11 ASSESSMENT — PAIN DESCRIPTION - ORIENTATION
ORIENTATION: RIGHT;LEFT
ORIENTATION: MID

## 2024-10-11 ASSESSMENT — PAIN DESCRIPTION - DESCRIPTORS: DESCRIPTORS: ACHING

## 2024-10-11 ASSESSMENT — PAIN DESCRIPTION - PAIN TYPE: TYPE: ACUTE PAIN

## 2024-10-11 ASSESSMENT — PAIN - FUNCTIONAL ASSESSMENT: PAIN_FUNCTIONAL_ASSESSMENT: 0-10

## 2024-10-11 NOTE — ED PROVIDER NOTES
EKG interpretation    I was asked to interpret this EKG.  I have not seen or evaluated this patient.  My interpretation is as follows:    Rhythm is normal sinus rhythm  Rate is 100  Axis is normal  No STEMI criteria  Qtc is normal    9:14 AM        Jose Manuel Rosales MD  10/11/24 0914

## 2024-10-11 NOTE — CARE COORDINATION
Case Management Assessment  Initial Evaluation    Date/Time of Evaluation: 10/11/2024 2:19 PM  Assessment Completed by: Purnima Chandler RN    If patient is discharged prior to next notation, then this note serves as note for discharge by case management.    Patient Name: Santa Restrepo                   YOB: 1970  Diagnosis: Hypoxia [R09.02]  Atypical pneumonia [J18.9]  Pneumonia due to gram-positive bacteria [J15.9]                   Date / Time: 10/11/2024  8:20 AM    Patient Admission Status: Inpatient   Readmission Risk (Low < 19, Mod (19-27), High > 27): Readmission Risk Score: 4.2    Current PCP: Ilda Sinclair PA  PCP verified by CM? Yes    Chart Reviewed: Yes      History Provided by: Patient  Patient Orientation: Alert and Oriented    Patient Cognition: Alert    Hospitalization in the last 30 days (Readmission):  No    If yes, Readmission Assessment in CM Navigator will be completed.    Advance Directives:      Code Status: Full Code   Patient's Primary Decision Maker is: Legal Next of Kin      Discharge Planning:    Patient lives with: Spouse/Significant Other Type of Home: House  Primary Care Giver: Self  Patient Support Systems include: Spouse/Significant Other   Current Financial resources: None  Current community resources: None  Current services prior to admission: None            Current DME:              Type of Home Care services:  None    ADLS  Prior functional level: Independent in ADLs/IADLs  Current functional level: Independent in ADLs/IADLs    PT AM-PAC:   /24  OT AM-PAC:   /24    Family can provide assistance at DC: Yes  Would you like Case Management to discuss the discharge plan with any other family members/significant others, and if so, who? No  Plans to Return to Present Housing: Yes  Other Identified Issues/Barriers to RETURNING to current housing: None  Potential Assistance needed at discharge: N/A            Potential DME:    Patient expects to discharge to:

## 2024-10-11 NOTE — ED NOTES
Santa Restrepo is a 54 y.o. female admitted for  Principal Problem:    Pneumonia due to gram-positive bacteria  Resolved Problems:    * No resolved hospital problems. *  .   Patient Home via self with   Chief Complaint   Patient presents with    Cough     Cough, shortness of breath, been on antibiotics and steroids with no improvement.  Ongoing symptoms for a month.     .  Patient is alert and Person, Place, Time, and Situation  Patient's baseline mobility: Baseline Mobility: Independent   Code Status: No Order   Cardiac Rhythm:       Is patient on baseline Oxygen: no how many Liters:   Abnormal Assessment Findings:     Isolation:       NIH Score:    C-SSRS: Risk of Suicide: No Risk  Bedside swallow:        Active LDA's:   Peripheral IV 10/11/24 Left Antecubital (Active)   Site Assessment Clean, dry & intact 10/11/24 0829   Line Status Blood return noted 10/11/24 0829     Patient admitted with a marshall:  If the marshall is chronic was it exchanged:  Reason for marshall:   Patient admitted with Central Line:  . PICC line placement confirmed: YES OR NO:[id number]}   Reason for Central line:   Was central line Inserted from an outside facility:        Family/Caregiver Present no Any Concerns: no   Restraints no  Sitter no         Vitals: MEWS Score: 1    Vitals:    10/11/24 0824 10/11/24 0945 10/11/24 1017 10/11/24 1143   BP: (!) 128/90 117/81 123/82 120/74   Pulse: (!) 101 86 92 96   Resp: 14 22 18 14   Temp: 98.3 °F (36.8 °C)      TempSrc: Oral      SpO2: 95% 96% 97% 94%   Weight: 64 kg (141 lb 1.6 oz)      Height: 1.651 m (5' 5\")          Last documented pain score (0-10 scale) Pain Level: 3  Pain medication administered No.    Pertinent or High Risk Medications/Drips: No.    Pending Blood Product Administration:     Abnormal labs:   Abnormal Labs Reviewed   CBC WITH AUTO DIFFERENTIAL - Abnormal; Notable for the following components:       Result Value    WBC 11.1 (*)     Neutrophils Absolute 8.6 (*)     All other components

## 2024-10-11 NOTE — H&P
Internal Medicine History and Physical    Pt evaluated on:  10/11/2024    CHIEF COMPLAINT:  cough, malaise, fatigue    History of Present Illness:  This is a 54 y.o. WF with PMHx sig for thyroid disease who presents with about a month of ongoing cough, sinus sx.  PT started with sinus sx and initially had a zpack, then progressed to another round of abx with no significant improvement.  She has had low grade fever, cough with yellow sputum.  Non-smoker, denies sick contacts except son who has had similar sx.  No pets, no birds, no travel.  CT scan shows no PE but atypical pneumonia.  She is hypoxic with ambulation.    Past Medical History:   Diagnosis Date    H/O parathyroid disease     Kidney stones 2011    Thyroid disease      Active Ambulatory Problems     Diagnosis Date Noted    UTI (urinary tract infection) due to Enterococcus 10/31/2016    Hyperparathyroidism (HCC) 2021    Perimenopause 2022    Urinary tract infectious disease 2023    Nodule of right lobe of thyroid gland 2023     Resolved Ambulatory Problems     Diagnosis Date Noted    Acute viral pharyngitis 08/15/2019     Past Medical History:   Diagnosis Date    H/O parathyroid disease     Kidney stones 2011    Thyroid disease          Past Medical History:   Diagnosis Date    H/O parathyroid disease     Kidney stones 2011    Thyroid disease      Past Surgical History:   Procedure Laterality Date    ABDOMINOPLASTY  2013    [place]    BREAST ENHANCEMENT SURGERY       SECTION  x 4     CYSTOSCOPY Left     CYSTOSCOPY, LEFT URETEROSCOPY, HOLMIUM LASER STONE MANIPULATION AND LEFT STENT PLACEMENT    PARATHYROIDECTOMY  2014    WISDOM TOOTH EXTRACTION      as 24 year old approx         Medications Prior to Admission:    Not in a hospital admission.  Current Facility-Administered Medications   Medication Dose Route Frequency Provider Last Rate Last Admin    ceFEPIme (MAXIPIME) 2,000 mg in sodium chloride 0.9 %

## 2024-10-11 NOTE — ED PROVIDER NOTES
[facility]  ED  EMERGENCY DEPARTMENT ENCOUNTER        Pt Name: Santa Restrepo  MRN: [id number]  Birthdate 1970  Date of evaluation: 10/11/2024  Provider: HOPE Valentin Jr  PCP: Ilda Sinclair PA  Note Started: 10:46 AM EDT 10/11/24       I have seen and evaluated this patient with my supervising physician Jose Manuel Rosales MD.      CHIEF COMPLAINT       Chief Complaint   Patient presents with    Cough     Cough, shortness of breath, been on antibiotics and steroids with no improvement.  Ongoing symptoms for a month.         HISTORY OF PRESENT ILLNESS: 1 or more Elements     History from : Patient    Limitations to history : None    Santa Restrepo is a 54 y.o. female who presents with complaints of shortness of breath, coughing and feeling congested in her chest.  States has been going on for around a month.  She did travel to [place] and when she returned her entire family has similar symptoms.  States that everyone else has gotten better at this point however, she is still been having symptoms.  She was initially treated with azithromycin and states that she was feeling improved however, after the antibiotics were finished her symptoms rebounded.  She was then placed on Augmentin and prednisone and the same improvement followed by rebound occurred.  Last dose of Augmentin was approximately 5 days ago.  Denies any hemoptysis but does endorse sputum production with cough.  No history of pulmonary embolism.  No other complaints.  Review of systems otherwise negative.    Nursing Notes were all reviewed and agreed with or any disagreements were addressed in the HPI.      SURGICAL HISTORY     Past Surgical History:   Procedure Laterality Date    ABDOMINOPLASTY  2013    [place]    BREAST ENHANCEMENT SURGERY       SECTION  x 4     CYSTOSCOPY Left     CYSTOSCOPY, LEFT URETEROSCOPY, HOLMIUM LASER STONE MANIPULATION AND LEFT STENT PLACEMENT    PARATHYROIDECTOMY  2014

## 2024-10-11 NOTE — CONSULTS
PharmD 10/11/2024 2:36 PM    Vancomycin Day of Therapy 2/7  Indication: CAP  Micro: BC in process   Current Dosing Method: Bayesian-Guided AUC Dosing  Therapeutic Goal: -600 mg/L*hr  Recent Labs     10/11/24  0849 10/12/24  0516 10/12/24  1026   VANCORANDOM  --   --  12.4   WBC 11.1* 11.6*  --    CREATININE 0.9 0.9  --    Estimated Creatinine Clearance: 66 mL/min (based on SCr of 0.9 mg/dL).  Current Dose / Plan:   Continue Vancomycin 1000 mg IV q12h.   Kinetics predict an AUC = 529 mg/L*h with an estimated steady-state vancomycin trough = 13.8 mcg/mL.  Will continue to monitor clinical condition and make adjustments to regimen as appropriate.  Petr Mix, PharmBLAZE    10/12/2024 11:10 AM   
tenderness  Neurologic: alert, no focal neurologic deficits    Data Review:  CBC:   Lab Results   Component Value Date/Time    WBC 11.1 10/11/2024 08:49 AM    RBC 4.40 10/11/2024 08:49 AM     BMP:   Lab Results   Component Value Date/Time    GLUCOSE 101 10/11/2024 08:49 AM    CO2 24 10/11/2024 08:49 AM    BUN 11 10/11/2024 08:49 AM    CREATININE 0.9 10/11/2024 08:49 AM    CALCIUM 9.4 10/11/2024 08:49 AM     ABG: No results found for: \"JNV1KBC\", \"BEART\", \"A4WINVEA\", \"PHART\", \"THGBART\", \"HBV4TYM\", \"PO2ART\", \"QSF5KKX\"    Radiology: All pertinent images / reports were reviewed as a part of this visit.    EXAMINATION:  CTA OF THE CHEST 10/11/2024 9:49 am     TECHNIQUE:  CTA of the chest was performed after the administration of intravenous  contrast.  Multiplanar reformatted images are provided for review.  MIP  images are provided for review. Automated exposure control, iterative  reconstruction, and/or weight based adjustment of the mA/kV was utilized to  reduce the radiation dose to as low as reasonably achievable.     COMPARISON:  None.     HISTORY:  ORDERING SYSTEM PROVIDED HISTORY: eval PE  TECHNOLOGIST PROVIDED HISTORY:  Reason for exam:->eval PE  Additional Contrast?->1  Reason for Exam: chest pain, some SOB, cough/congestion  Additional signs and symptoms: no h/o blood clots  Relevant Medical/Surgical History: breast implants     FINDINGS:  Pulmonary Arteries: Pulmonary arteries are adequately opacified for  evaluation.  No evidence of intraluminal filling defect to suggest pulmonary  embolism.  Main pulmonary artery is normal in caliber.     Mediastinum: No evidence of mediastinal lymphadenopathy.  The heart and  pericardium demonstrate no acute abnormality.  There is no acute abnormality  of the thoracic aorta.     Lungs/pleura: Mild thickening of central airways is noted with scattered  bibasilar and lingular tree-in-bud micronodularity.  The lung bases exhibit  mosaic attenuation consistent with air

## 2024-10-11 NOTE — ED NOTES
Ambulatory pulse oximetry completed, patient oxygen saturation dropped as low as 86% on room air with ambulation, heart rate 110.  Quick rebound to 96% room air at rest.

## 2024-10-12 PROBLEM — J15.7 PNEUMONIA OF BOTH LUNGS DUE TO MYCOPLASMA PNEUMONIAE: Status: ACTIVE | Noted: 2024-10-12

## 2024-10-12 LAB
ANION GAP SERPL CALCULATED.3IONS-SCNC: 9 MMOL/L (ref 3–16)
BASOPHILS # BLD: 0.1 K/UL (ref 0–0.2)
BASOPHILS NFR BLD: 0.6 %
BUN SERPL-MCNC: 13 MG/DL (ref 7–20)
CALCIUM SERPL-MCNC: 8.3 MG/DL (ref 8.3–10.6)
CHLORIDE SERPL-SCNC: 105 MMOL/L (ref 99–110)
CO2 SERPL-SCNC: 22 MMOL/L (ref 21–32)
CREAT SERPL-MCNC: 0.9 MG/DL (ref 0.6–1.1)
DEPRECATED RDW RBC AUTO: 13.3 % (ref 12.4–15.4)
EOSINOPHIL # BLD: 0.4 K/UL (ref 0–0.6)
EOSINOPHIL NFR BLD: 3.3 %
GFR SERPLBLD CREATININE-BSD FMLA CKD-EPI: 77 ML/MIN/{1.73_M2}
GLUCOSE SERPL-MCNC: 99 MG/DL (ref 70–99)
HCT VFR BLD AUTO: 34.4 % (ref 36–48)
HGB BLD-MCNC: 11.4 G/DL (ref 12–16)
LYMPHOCYTES # BLD: 1 K/UL (ref 1–5.1)
LYMPHOCYTES NFR BLD: 8.3 %
MCH RBC QN AUTO: 29.2 PG (ref 26–34)
MCHC RBC AUTO-ENTMCNC: 33.1 G/DL (ref 31–36)
MCV RBC AUTO: 88.4 FL (ref 80–100)
MONOCYTES # BLD: 1 K/UL (ref 0–1.3)
MONOCYTES NFR BLD: 8.7 %
NEUTROPHILS # BLD: 9.2 K/UL (ref 1.7–7.7)
NEUTROPHILS NFR BLD: 79.1 %
ORGANISM: ABNORMAL
PLATELET # BLD AUTO: 278 K/UL (ref 135–450)
PMV BLD AUTO: 9.5 FL (ref 5–10.5)
POTASSIUM SERPL-SCNC: 4.4 MMOL/L (ref 3.5–5.1)
RBC # BLD AUTO: 3.89 M/UL (ref 4–5.2)
REPORT: NORMAL
RESP PATH DNA+RNA PNL NPH NAA+NON-PROBE: ABNORMAL
SODIUM SERPL-SCNC: 136 MMOL/L (ref 136–145)
VANCOMYCIN SERPL-MCNC: 12.4 UG/ML
WBC # BLD AUTO: 11.6 K/UL (ref 4–11)

## 2024-10-12 PROCEDURE — 80202 ASSAY OF VANCOMYCIN: CPT

## 2024-10-12 PROCEDURE — 1200000000 HC SEMI PRIVATE

## 2024-10-12 PROCEDURE — 85025 COMPLETE CBC W/AUTO DIFF WBC: CPT

## 2024-10-12 PROCEDURE — 2580000003 HC RX 258: Performed by: INTERNAL MEDICINE

## 2024-10-12 PROCEDURE — 36415 COLL VENOUS BLD VENIPUNCTURE: CPT

## 2024-10-12 PROCEDURE — 80048 BASIC METABOLIC PNL TOTAL CA: CPT

## 2024-10-12 PROCEDURE — 6370000000 HC RX 637 (ALT 250 FOR IP): Performed by: INTERNAL MEDICINE

## 2024-10-12 PROCEDURE — 99232 SBSQ HOSP IP/OBS MODERATE 35: CPT | Performed by: INTERNAL MEDICINE

## 2024-10-12 PROCEDURE — 6360000002 HC RX W HCPCS: Performed by: INTERNAL MEDICINE

## 2024-10-12 RX ADMIN — ACETAMINOPHEN 650 MG: 325 TABLET ORAL at 13:43

## 2024-10-12 RX ADMIN — AZITHROMYCIN MONOHYDRATE 500 MG: 500 INJECTION, POWDER, LYOPHILIZED, FOR SOLUTION INTRAVENOUS at 17:27

## 2024-10-12 RX ADMIN — ACETAMINOPHEN 650 MG: 325 TABLET ORAL at 06:01

## 2024-10-12 RX ADMIN — VANCOMYCIN 1000 MG: 1 INJECTION, SOLUTION INTRAVENOUS at 00:46

## 2024-10-12 RX ADMIN — ONDANSETRON 4 MG: 4 TABLET, ORALLY DISINTEGRATING ORAL at 18:07

## 2024-10-12 RX ADMIN — ACETAMINOPHEN 650 MG: 325 TABLET ORAL at 20:25

## 2024-10-12 RX ADMIN — PROGESTERONE 100 MG: 100 CAPSULE ORAL at 20:15

## 2024-10-12 RX ADMIN — BENZONATATE 100 MG: 100 CAPSULE ORAL at 06:04

## 2024-10-12 RX ADMIN — TRETINOIN: 0.25 CREAM TOPICAL at 08:19

## 2024-10-12 RX ADMIN — CEFEPIME 2000 MG: 2 INJECTION, POWDER, FOR SOLUTION INTRAVENOUS at 11:21

## 2024-10-12 RX ADMIN — BENZONATATE 100 MG: 100 CAPSULE ORAL at 13:43

## 2024-10-12 RX ADMIN — CEFEPIME 2000 MG: 2 INJECTION, POWDER, FOR SOLUTION INTRAVENOUS at 04:36

## 2024-10-12 RX ADMIN — BENZONATATE 100 MG: 100 CAPSULE ORAL at 22:20

## 2024-10-12 RX ADMIN — Medication 10 ML: at 20:17

## 2024-10-12 RX ADMIN — SODIUM CHLORIDE: 9 INJECTION, SOLUTION INTRAVENOUS at 11:21

## 2024-10-12 RX ADMIN — Medication 1 CAPSULE: at 17:25

## 2024-10-12 RX ADMIN — Medication 1 CAPSULE: at 08:17

## 2024-10-12 ASSESSMENT — PAIN DESCRIPTION - ORIENTATION
ORIENTATION: RIGHT;LEFT
ORIENTATION: RIGHT;LEFT
ORIENTATION: LEFT

## 2024-10-12 ASSESSMENT — PAIN DESCRIPTION - LOCATION
LOCATION: RIB CAGE
LOCATION: BACK
LOCATION: CHEST
LOCATION: CHEST

## 2024-10-12 ASSESSMENT — PAIN SCALES - GENERAL
PAINLEVEL_OUTOF10: 6
PAINLEVEL_OUTOF10: 5
PAINLEVEL_OUTOF10: 6
PAINLEVEL_OUTOF10: 4

## 2024-10-12 ASSESSMENT — PAIN DESCRIPTION - DESCRIPTORS
DESCRIPTORS: ACHING

## 2024-10-12 NOTE — PLAN OF CARE
Problem: Discharge Planning  Goal: Discharge to home or other facility with appropriate resources  Outcome: Progressing  Flowsheets (Taken 10/12/2024 0800)  Discharge to home or other facility with appropriate resources: Identify barriers to discharge with patient and caregiver     Problem: Pain  Goal: Verbalizes/displays adequate comfort level or baseline comfort level  Outcome: Progressing     Problem: Safety - Adult  Goal: Free from fall injury  Outcome: Progressing     Problem: Respiratory - Adult  Goal: Achieves optimal ventilation and oxygenation  Outcome: Progressing

## 2024-10-12 NOTE — PLAN OF CARE
Monitor Respiratory status, SOB, cough ,Keep O2 saturation above 90 %.  Keep I and O. Encourage fluids. Offer cough medication.    Monitor for Headache, treat as needed.

## 2024-10-13 PROBLEM — J15.9 PNEUMONIA DUE TO GRAM-POSITIVE BACTERIA: Status: RESOLVED | Noted: 2024-10-11 | Resolved: 2024-10-13

## 2024-10-13 LAB
BACTERIA SPEC RESP CULT: NORMAL
GRAM STN SPEC: NORMAL
LEGIONELLA AG UR QL: NORMAL
S PNEUM AG UR QL: NORMAL

## 2024-10-13 PROCEDURE — 6360000002 HC RX W HCPCS: Performed by: INTERNAL MEDICINE

## 2024-10-13 PROCEDURE — 99232 SBSQ HOSP IP/OBS MODERATE 35: CPT | Performed by: INTERNAL MEDICINE

## 2024-10-13 PROCEDURE — 1200000000 HC SEMI PRIVATE

## 2024-10-13 PROCEDURE — 6370000000 HC RX 637 (ALT 250 FOR IP): Performed by: INTERNAL MEDICINE

## 2024-10-13 PROCEDURE — 2580000003 HC RX 258: Performed by: INTERNAL MEDICINE

## 2024-10-13 RX ORDER — GUAIFENESIN 200 MG/10ML
200 LIQUID ORAL EVERY 4 HOURS PRN
Status: CANCELLED | COMMUNITY
Start: 2024-10-13

## 2024-10-13 RX ORDER — ESTRADIOL 0.1 MG/D
1 PATCH TRANSDERMAL
Status: CANCELLED | COMMUNITY
Start: 2024-10-15

## 2024-10-13 RX ADMIN — SODIUM CHLORIDE: 9 INJECTION, SOLUTION INTRAVENOUS at 17:14

## 2024-10-13 RX ADMIN — ONDANSETRON 4 MG: 4 TABLET, ORALLY DISINTEGRATING ORAL at 16:57

## 2024-10-13 RX ADMIN — Medication 1 CAPSULE: at 08:43

## 2024-10-13 RX ADMIN — Medication 10 ML: at 20:41

## 2024-10-13 RX ADMIN — ACETAMINOPHEN 650 MG: 325 TABLET ORAL at 16:57

## 2024-10-13 RX ADMIN — ACETAMINOPHEN 650 MG: 325 TABLET ORAL at 08:43

## 2024-10-13 RX ADMIN — AZITHROMYCIN MONOHYDRATE 500 MG: 500 INJECTION, POWDER, LYOPHILIZED, FOR SOLUTION INTRAVENOUS at 17:15

## 2024-10-13 RX ADMIN — PROGESTERONE 100 MG: 100 CAPSULE ORAL at 20:41

## 2024-10-13 RX ADMIN — BENZONATATE 100 MG: 100 CAPSULE ORAL at 16:57

## 2024-10-13 RX ADMIN — Medication 10 ML: at 08:46

## 2024-10-13 RX ADMIN — ACETAMINOPHEN 650 MG: 325 TABLET ORAL at 22:55

## 2024-10-13 RX ADMIN — Medication 1 CAPSULE: at 17:21

## 2024-10-13 ASSESSMENT — PAIN DESCRIPTION - ORIENTATION
ORIENTATION: RIGHT

## 2024-10-13 ASSESSMENT — PAIN DESCRIPTION - LOCATION
LOCATION: RIB CAGE

## 2024-10-13 ASSESSMENT — PAIN DESCRIPTION - DESCRIPTORS
DESCRIPTORS: ACHING

## 2024-10-13 ASSESSMENT — PAIN SCALES - GENERAL
PAINLEVEL_OUTOF10: 5
PAINLEVEL_OUTOF10: 4
PAINLEVEL_OUTOF10: 4
PAINLEVEL_OUTOF10: 6

## 2024-10-13 ASSESSMENT — PAIN DESCRIPTION - PAIN TYPE: TYPE: ACUTE PAIN

## 2024-10-13 NOTE — PLAN OF CARE
Problem: Discharge Planning  Goal: Discharge to home or other facility with appropriate resources  10/13/2024 0422 by Sabrina Shaver RN  Outcome: Progressing  10/12/2024 1515 by Stephanie Raza RN  Outcome: Progressing  Flowsheets (Taken 10/12/2024 0800)  Discharge to home or other facility with appropriate resources: Identify barriers to discharge with patient and caregiver     Problem: Pain  Goal: Verbalizes/displays adequate comfort level or baseline comfort level  10/13/2024 0422 by Sabrina Shaver RN  Outcome: Progressing  10/12/2024 1515 by Stephanie Raza RN  Outcome: Progressing     Problem: Safety - Adult  Goal: Free from fall injury  10/13/2024 0422 by Sabrina Sahver RN  Outcome: Progressing  10/12/2024 1515 by Stephanie Raza RN  Outcome: Progressing     Problem: Respiratory - Adult  Goal: Achieves optimal ventilation and oxygenation  10/13/2024 0422 by Sabrina Shaver RN  Outcome: Progressing  10/12/2024 1515 by Stephanie Raza RN  Outcome: Progressing

## 2024-10-13 NOTE — PLAN OF CARE
Problem: Discharge Planning  Goal: Discharge to home or other facility with appropriate resources  10/13/2024 1008 by Stephanie Raza RN  Outcome: Progressing  10/13/2024 0422 by Sabrina Shaver RN  Outcome: Progressing     Problem: Pain  Goal: Verbalizes/displays adequate comfort level or baseline comfort level  10/13/2024 1008 by Stephanie Raza RN  Outcome: Progressing  10/13/2024 0422 by Sabrina Shaver RN  Outcome: Progressing     Problem: Safety - Adult  Goal: Free from fall injury  10/13/2024 1008 by Stephanie Raza RN  Outcome: Progressing  10/13/2024 0422 by Sabrina Shaver RN  Outcome: Progressing     Problem: Respiratory - Adult  Goal: Achieves optimal ventilation and oxygenation  10/13/2024 1008 by Stephanie Raza RN  Outcome: Progressing  10/13/2024 0422 by Sabrina Shaver RN  Outcome: Progressing

## 2024-10-14 PROCEDURE — 2580000003 HC RX 258: Performed by: INTERNAL MEDICINE

## 2024-10-14 PROCEDURE — 6370000000 HC RX 637 (ALT 250 FOR IP): Performed by: INTERNAL MEDICINE

## 2024-10-14 PROCEDURE — 99232 SBSQ HOSP IP/OBS MODERATE 35: CPT | Performed by: STUDENT IN AN ORGANIZED HEALTH CARE EDUCATION/TRAINING PROGRAM

## 2024-10-14 PROCEDURE — 2500000003 HC RX 250 WO HCPCS: Performed by: INTERNAL MEDICINE

## 2024-10-14 PROCEDURE — 2700000000 HC OXYGEN THERAPY PER DAY

## 2024-10-14 PROCEDURE — 6360000002 HC RX W HCPCS: Performed by: INTERNAL MEDICINE

## 2024-10-14 PROCEDURE — 1200000000 HC SEMI PRIVATE

## 2024-10-14 PROCEDURE — 94761 N-INVAS EAR/PLS OXIMETRY MLT: CPT

## 2024-10-14 PROCEDURE — 94640 AIRWAY INHALATION TREATMENT: CPT

## 2024-10-14 PROCEDURE — 94669 MECHANICAL CHEST WALL OSCILL: CPT

## 2024-10-14 RX ORDER — ALBUTEROL SULFATE 0.83 MG/ML
2.5 SOLUTION RESPIRATORY (INHALATION) EVERY 6 HOURS PRN
Status: DISCONTINUED | OUTPATIENT
Start: 2024-10-14 | End: 2024-10-15 | Stop reason: HOSPADM

## 2024-10-14 RX ORDER — AZITHROMYCIN 250 MG/1
500 TABLET, FILM COATED ORAL DAILY
Status: DISCONTINUED | OUTPATIENT
Start: 2024-10-14 | End: 2024-10-15 | Stop reason: HOSPADM

## 2024-10-14 RX ADMIN — PROGESTERONE 100 MG: 100 CAPSULE ORAL at 20:34

## 2024-10-14 RX ADMIN — ACETAMINOPHEN 650 MG: 325 TABLET ORAL at 14:05

## 2024-10-14 RX ADMIN — BENZONATATE 100 MG: 100 CAPSULE ORAL at 20:34

## 2024-10-14 RX ADMIN — AZITHROMYCIN 500 MG: 250 TABLET, FILM COATED ORAL at 16:27

## 2024-10-14 RX ADMIN — GUAIFENESIN 200 MG: 200 SOLUTION ORAL at 06:24

## 2024-10-14 RX ADMIN — ALBUTEROL SULFATE 2.5 MG: 2.5 SOLUTION RESPIRATORY (INHALATION) at 09:14

## 2024-10-14 RX ADMIN — ACETAMINOPHEN 650 MG: 325 TABLET ORAL at 20:34

## 2024-10-14 RX ADMIN — Medication 1 CAPSULE: at 09:15

## 2024-10-14 RX ADMIN — ACETAMINOPHEN 650 MG: 325 TABLET ORAL at 06:23

## 2024-10-14 RX ADMIN — Medication 10 ML: at 20:35

## 2024-10-14 RX ADMIN — Medication 10 ML: at 09:16

## 2024-10-14 RX ADMIN — Medication 1 CAPSULE: at 16:27

## 2024-10-14 RX ADMIN — BENZONATATE 100 MG: 100 CAPSULE ORAL at 01:35

## 2024-10-14 ASSESSMENT — PAIN DESCRIPTION - DESCRIPTORS
DESCRIPTORS: ACHING
DESCRIPTORS: STABBING
DESCRIPTORS: STABBING
DESCRIPTORS: ACHING;SORE

## 2024-10-14 ASSESSMENT — PAIN DESCRIPTION - LOCATION
LOCATION: CHEST
LOCATION: RIB CAGE
LOCATION: CHEST
LOCATION: RIB CAGE

## 2024-10-14 ASSESSMENT — PAIN SCALES - GENERAL
PAINLEVEL_OUTOF10: 3
PAINLEVEL_OUTOF10: 0
PAINLEVEL_OUTOF10: 3
PAINLEVEL_OUTOF10: 7
PAINLEVEL_OUTOF10: 6

## 2024-10-14 ASSESSMENT — PAIN - FUNCTIONAL ASSESSMENT: PAIN_FUNCTIONAL_ASSESSMENT: ACTIVITIES ARE NOT PREVENTED

## 2024-10-14 ASSESSMENT — PAIN DESCRIPTION - ORIENTATION
ORIENTATION: RIGHT;UPPER
ORIENTATION: RIGHT

## 2024-10-14 ASSESSMENT — PAIN DESCRIPTION - FREQUENCY: FREQUENCY: INTERMITTENT

## 2024-10-14 ASSESSMENT — PAIN DESCRIPTION - PAIN TYPE: TYPE: ACUTE PAIN

## 2024-10-14 ASSESSMENT — PAIN DESCRIPTION - ONSET: ONSET: ON-GOING

## 2024-10-14 NOTE — PLAN OF CARE
Problem: Discharge Planning  Goal: Discharge to home or other facility with appropriate resources  10/13/2024 2229 by Maria Del Rosario Foote, RN  Outcome: Progressing     Problem: Pain  Goal: Verbalizes/displays adequate comfort level or baseline comfort level  10/13/2024 2229 by Maria Del Rosario Foote, RN  Outcome: Progressing     Problem: Safety - Adult  Goal: Free from fall injury  10/13/2024 2229 by Maria Del Rosario Foote, RN  Outcome: Progressing     Problem: Respiratory - Adult  Goal: Achieves optimal ventilation and oxygenation  10/13/2024 2229 by Maria Del Rosario Foote, RN  Outcome: Progressing     Problem: Infection - Adult  Goal: Absence of infection at discharge  Outcome: Progressing

## 2024-10-14 NOTE — RT PROTOCOL NOTE
using Per Protocol order mode.        4-6 - enter or revise RT Bronchodilator order(s) to two equivalent RT bronchodilator orders with one order with BID Frequency and one order with Frequency of every 4 hours PRN wheezing or increased work of breathing using Per Protocol order mode.        7-10 - enter or revise RT Bronchodilator order(s) to two equivalent RT bronchodilator orders with one order with TID Frequency and one order with Frequency of every 4 hours PRN wheezing or increased work of breathing using Per Protocol order mode.       11-13 - enter or revise RT Bronchodilator order(s) to one equivalent RT bronchodilator order with QID Frequency and an Albuterol order with Frequency of every 4 hours PRN wheezing or increased work of breathing using Per Protocol order mode.      Greater than 13 - enter or revise RT Bronchodilator order(s) to one equivalent RT bronchodilator order with every 4 hours Frequency and an Albuterol order with Frequency of every 2 hours PRN wheezing or increased work of breathing using Per Protocol order mode.     RT to enter RT Home Evaluation for COPD & MDI Assessment order using Per Protocol order mode.    Electronically signed by Jeannette Garcia on 10/14/2024 at 2:17 PM

## 2024-10-14 NOTE — CARE COORDINATION
Writer reviewed chart patient was switched over to oral Abx. Will watch for another day and possible DC 10/15 with NN.     Purnima Chandler RN

## 2024-10-14 NOTE — PLAN OF CARE
Problem: Discharge Planning  Goal: Discharge to home or other facility with appropriate resources  10/14/2024 0951 by Micky Balderas RN  Outcome: Progressing  10/13/2024 2229 by Maria Del Rosario Foote RN  Outcome: Progressing     Problem: Pain  Goal: Verbalizes/displays adequate comfort level or baseline comfort level  10/14/2024 0951 by Micky Balderas RN  Outcome: Progressing  10/13/2024 2229 by Maria Del Rosario Foote RN  Outcome: Progressing     Problem: Safety - Adult  Goal: Free from fall injury  10/14/2024 0951 by Micky Balderas RN  Outcome: Progressing  10/13/2024 2229 by Maria Del Rosario Foote RN  Outcome: Progressing     Problem: Respiratory - Adult  Goal: Achieves optimal ventilation and oxygenation  10/14/2024 0951 by Mciky Balderas RN  Outcome: Progressing  10/13/2024 2229 by Maria Del Rosario Foote RN  Outcome: Progressing     Problem: Infection - Adult  Goal: Absence of infection at discharge  10/14/2024 0951 by Micky Balderas RN  Outcome: Progressing  10/13/2024 2229 by Maria Del Rosario Foote RN  Outcome: Progressing

## 2024-10-15 VITALS
HEART RATE: 76 BPM | HEIGHT: 65 IN | OXYGEN SATURATION: 94 % | TEMPERATURE: 99 F | WEIGHT: 141.7 LBS | BODY MASS INDEX: 23.61 KG/M2 | RESPIRATION RATE: 16 BRPM | DIASTOLIC BLOOD PRESSURE: 70 MMHG | SYSTOLIC BLOOD PRESSURE: 100 MMHG

## 2024-10-15 LAB
BACTERIA BLD CULT ORG #2: NORMAL
BACTERIA BLD CULT: NORMAL

## 2024-10-15 PROCEDURE — 6370000000 HC RX 637 (ALT 250 FOR IP): Performed by: INTERNAL MEDICINE

## 2024-10-15 PROCEDURE — 2580000003 HC RX 258: Performed by: INTERNAL MEDICINE

## 2024-10-15 RX ORDER — BENZONATATE 100 MG/1
100 CAPSULE ORAL 3 TIMES DAILY PRN
Qty: 21 CAPSULE | Refills: 0 | Status: SHIPPED | OUTPATIENT
Start: 2024-10-15

## 2024-10-15 RX ORDER — BENZONATATE 100 MG/1
100 CAPSULE ORAL 3 TIMES DAILY PRN
Qty: 21 CAPSULE | Refills: 0 | Status: SHIPPED | OUTPATIENT
Start: 2024-10-15 | End: 2024-10-15

## 2024-10-15 RX ORDER — AZITHROMYCIN 500 MG/1
500 TABLET, FILM COATED ORAL DAILY
Qty: 2 TABLET | Refills: 0 | Status: SHIPPED | OUTPATIENT
Start: 2024-10-16 | End: 2024-10-18

## 2024-10-15 RX ADMIN — Medication 1 CAPSULE: at 08:28

## 2024-10-15 RX ADMIN — Medication 10 ML: at 08:26

## 2024-10-15 RX ADMIN — AZITHROMYCIN 500 MG: 250 TABLET, FILM COATED ORAL at 08:28

## 2024-10-15 RX ADMIN — ACETAMINOPHEN 650 MG: 325 TABLET ORAL at 08:29

## 2024-10-15 ASSESSMENT — PAIN DESCRIPTION - ORIENTATION: ORIENTATION: RIGHT

## 2024-10-15 ASSESSMENT — PAIN DESCRIPTION - PAIN TYPE: TYPE: ACUTE PAIN

## 2024-10-15 ASSESSMENT — PAIN SCALES - GENERAL: PAINLEVEL_OUTOF10: 4

## 2024-10-15 ASSESSMENT — PAIN DESCRIPTION - LOCATION: LOCATION: CHEST

## 2024-10-15 NOTE — DISCHARGE INSTRUCTIONS
Follow up with PCP within 1-2 weeks.          Walking Pneumonia: Care Instructions  Your Care Instructions     Walking pneumonia is an infection of the lungs caused by the mycoplasma bacteria. This form of pneumonia is usually mild and feels like a chest cold, but it can get worse. The symptoms of cough, headache, and a low fever start slowly. The infection is usually so mild that you may walk around with it without knowing that you have it. Most people don't get sick enough to be in the hospital.  Walking pneumonia is usually treated with antibiotics.  Your cough may last for 2 to 3 weeks after the infection has been treated. You may have some wheezing too. These symptoms will go away over time.  Follow-up care is a key part of your treatment and safety. Be sure to make and go to all appointments, and call your doctor if you are having problems. It's also a good idea to know your test results and keep a list of the medicines you take.  How can you care for yourself at home?  Be safe with medicines. Take your medicines exactly as prescribed. Call your doctor if you think you are having a problem with your medicine.  Take your antibiotics as directed. Do not stop taking them just because you feel better. You need to take the full course of antibiotics.  Ask your doctor if you can take an over-the-counter pain medicine, such as acetaminophen (Tylenol), ibuprofen (Advil, Motrin), or naproxen (Aleve). Read and follow all instructions on the label.  Do not take two or more pain medicines at the same time unless the doctor told you to. Many pain medicines have acetaminophen, which is Tylenol. Too much acetaminophen (Tylenol) can be harmful.  Take care of your cough so you can rest. A cough that brings up mucus from your lungs is common with pneumonia. It is one way your body gets rid of the infection. But if coughing keeps you from resting or causes severe fatigue and chest-wall pain, talk to your doctor. They may suggest

## 2024-10-15 NOTE — DISCHARGE SUMMARY
Discharge Summary    Name:  Santa Restrepo /Age/Sex: 1970 (54 y.o. female)   Admit Date: 10/11/2024  Discharge Date: 10/15/24   MRN & CSN:  [id number] & [id number] Encounter Date and Time 10/15/24 9:51 AM EDT    Attending:  Saurabh Finley MD Discharging Provider: SAURABH FINLEY MD       Hospital Course:       Healthy 54 Y F admitted with mycoplasma pneumonia.  Improved with azithromycin, discharging with two more days.  Acute hypoxic respiratory failure has resolved.        Discharge Diagnosis:   Pneumonia of both lungs due to Mycoplasma pneumoniae      Discharge Instructions:     Follow up with PCP within 1-2 weeks.      Diet: ADULT DIET; Regular  Activity: activity as tolerated  Discharged to:  home  Condition on discharge: Stable    Objective Findings at Discharge:   /70   Pulse 76   Temp 99 °F (37.2 °C) (Oral)   Resp 16   Ht 1.651 m (5' 5\")   Wt 64.3 kg (141 lb 11.2 oz)   SpO2 94%   BMI 23.58 kg/m²       Physical Exam:     General appearance: No apparent distress, appears stated age and cooperative.  HEENT: Pupils equal, round.  Conjunctivae/corneas clear.  Neck: No jugular venous distention.   Respiratory:  Normal respiratory effort.  Bilaterally without Rales/Wheezes/Rhonchi.  Cardiovascular: Normal rate and regular rhythm with normal S1/S2 without murmurs, rubs or gallops.  Abdomen: Soft, non-tender, non-distended with normal bowel sounds.  Musculoskeletal: No cyanosis bilaterally.  No edema.  Without deformity.  Skin: No jaundice.  No rashes or lesions.  Neurologic:  Neurovascularly intact without any focal sensory/motor deficits. Cranial nerves: II-XII intact, grossly non-focal.  Psychiatric: Alert and oriented, normal insight.      Discharge Medications:        Medication List        START taking these medications      azithromycin 500 MG tablet  Commonly known as: ZITHROMAX  Take 1 tablet by mouth daily for 2 doses  Start taking on: 2024     benzonatate 100 MG

## 2024-10-15 NOTE — PROGRESS NOTES
Hospitalist Progress Note    CC: Pneumonia of both lungs due to Mycoplasma pneumoniae    Name:  Santa Restrepo /Age/Sex: 1970  (54 y.o. female)   MRN & CSN:  [id number] & [id number] Encounter Date/Time: 10/14/2024 12:10 PM EDT   Location:  [facility]/0545-01 PCP: Ilda Sinclair PA     Attending:Saurabh Finley MD         Hospital course:  54 y.o. WF with PMHx sig for thyroid disease who presents with about a month of ongoing cough, sinus sx.  PT started with sinus sx and initially had a zpack, then progressed to another round of abx with no significant improvement.  She has had low grade fever, cough with yellow sputum.  Non-smoker, denies sick contacts except son who has had similar sx.  No pets, no birds, no travel.  CT scan shows no PE but atypical pneumonia.  She is hypoxic with ambulation.   Pt with mycoplasma pneumonia.       Subjective: She feels about the same.  Now on oxygen.  Afebrile.      Objective:    /66   Pulse 82   Temp 98.8 °F (37.1 °C) (Oral)   Resp 18   Ht 1.651 m (5' 5\")   Wt 64.3 kg (141 lb 11.2 oz)   SpO2 91%   BMI 23.58 kg/m²     Gen: tired appearing, alert, cooperative  HEENT: NC/AT, moist mucous membranes, no oropharyngeal erythema or exudate  Neck: supple, trachea midline, no anterior cervical or SC LAD  Heart:  reg rate and rhythm, no murmurs, no gallops, no rubs. no leg edema  Lungs: bilateral rhonchi in bases and some crackles - this continues on today's exam  Abd: soft, non-tender, non-distended, normal bowel sounds, no masses or organomegaly  Extrem:  no clubbing, cyanosis, or edema, no ulcers, no Cristopher's sign, and normal pulses, equal and bilateral 2+  Skin: Warm  Psych: A & O x3  Neuro: grossly intact, moves all 4 extremities      Sputum cx:      2+ WBC's (Polymorphonuclear) present  1+ WBC's (Mononuclear) present  1+ Epithelial Cells present  2+ Gram positive cocci  in chains and pairs- resembling Strep  1+ Gram 
  Physician Progress Note      PATIENT:               FIORDALIZA MARTINEZ  [facility] #:                  [id number]  :                       1970  ADMIT DATE:       10/11/2024 8:20 AM  DISCH DATE:  RESPONDING  PROVIDER #:        Jhonny Finley MD          QUERY TEXT:    Patient admitted with pneumonia. Noted documentation of acute respiratory   failure with hypoxia during ambulation.  Nursing VS flowsheet shows patient on   room air until 10/14 and placed on 1L.  The physical assessment in ED:    Effort: Pulmonary effort is normal. No respiratory distress. Patient alert and   oriented. Due to conflicting clinical picture, please add additional clinical   indicators in support of acute respiratory failure or specify if ruled out   after study:    The medical record reflects the following:  Risk Factors: age, pneumonia  Clinical Indicators: acute respiratory failure with hypoxia during ambulation.    Nursing VS flowsheet shows patient on room air until 10/14 and placed on 1L.    The physical assessment in ED:  Effort: Pulmonary effort is normal. No   respiratory distress. Patient alert and oriented.  Treatment: abx, oxygen sat monitoring, 1L oxygen, pulmonary consult, CT chest,   prn tessalon perles  Options provided:  -- Acute Respiratory Failure as evidenced by, Please document evidence.  -- Acute Respiratory Failure ruled out after study  -- Other - I will add my own diagnosis  -- Disagree - Not applicable / Not valid  -- Disagree - Clinically unable to determine / Unknown  -- Refer to Clinical Documentation Reviewer    PROVIDER RESPONSE TEXT:    Acute Respiratory Failure has been ruled out after study.    Query created by: Cierra Delgado on 10/14/2024 9:50 AM      Electronically signed by:  Jhonny Finley MD 10/15/2024 7:10 AM          
Admitted to room 545 per stretcher.  Denies weakness and dizziness.   Vital signs stable O2 saturation above 90% , Has cough. Gait steady. Oriented to room and call light system.   
[facility] Pulmonary/CCM Progress note      Admit Date: 10/11/2024    Chief Complaint: Shortness of breath, cough, fevers and fatigue    Subjective:     Interval History: Continues to have cough with scant phlegm.  Feels pretty much the same.  On room air.    Scheduled Meds:   azithromycin  500 mg IntraVENous Q24H    sodium chloride flush  5-40 mL IntraVENous 2 times per day    enoxaparin  40 mg SubCUTAneous Daily    cefepime  2,000 mg IntraVENous Once    lactobacillus  1 capsule Oral BID WC    [START ON 10/15/2024] estradiol  1 patch TransDERmal Once per day on Tuesday Friday    progesterone  100 mg Oral Nightly    tretinoin   Topical Every Other Day     Continuous Infusions:   sodium chloride 25 mL/hr at 10/12/24 1121     PRN Meds:sodium chloride flush, sodium chloride, ondansetron **OR** ondansetron, polyethylene glycol, acetaminophen **OR** acetaminophen, benzonatate, guaiFENesin    Review of Systems  Constitutional: negative for fatigue, fevers, malaise and weight loss  Ears, nose, mouth, throat: negative for ear drainage, epistaxis, hoarseness, nasal congestion, sore throat and voice change  Respiratory: negative except for cough, shortness of breath, and sputum  Cardiovascular: negative for chest pain, chest pressure/discomfort, irregular heart beat, lower extremity edema and palpitations  Gastrointestinal: negative for abdominal pain, constipation, diarrhea, jaundice, melena, odynophagia, reflux symptoms and vomiting  Hematologic/lymphatic: negative for bleeding, easy bruising, lymphadenopathy and petechiae  Musculoskeletal:negative for arthralgias, bone pain, muscle weakness, neck pain and stiff joints  Neurological: negative for dizziness, gait problems, headaches, seizures, speech problems, tremors and weakness  Behavioral/Psych: negative for anxiety, behavior problems, depression, fatigue and sleep disturbance  Endocrine: negative for diabetic symptoms including none, neuropathy, polyphagia, polyuria, 
[facility] Pulmonary/CCM Progress note      Admit Date: 10/11/2024    Chief Complaint: Shortness of breath, cough, fevers and fatigue    Subjective:     Interval History: Slight improvement in cough noted, scant phlegm.  Remains on room air.  Still has chest congestion.    Scheduled Meds:   azithromycin  500 mg IntraVENous Q24H    sodium chloride flush  5-40 mL IntraVENous 2 times per day    enoxaparin  40 mg SubCUTAneous Daily    cefepime  2,000 mg IntraVENous Once    lactobacillus  1 capsule Oral BID WC    [START ON 10/15/2024] estradiol  1 patch TransDERmal Once per day on Tuesday Friday    progesterone  100 mg Oral Nightly    tretinoin   Topical Every Other Day     Continuous Infusions:   sodium chloride 25 mL/hr at 10/12/24 1121     PRN Meds:sodium chloride flush, sodium chloride, ondansetron **OR** ondansetron, polyethylene glycol, acetaminophen **OR** acetaminophen, benzonatate, guaiFENesin    Review of Systems  Constitutional: negative for fatigue, fevers, malaise and weight loss  Ears, nose, mouth, throat: negative for ear drainage, epistaxis, hoarseness, nasal congestion, sore throat and voice change  Respiratory: negative except for cough, shortness of breath, and sputum  Cardiovascular: negative for chest pain, chest pressure/discomfort, irregular heart beat, lower extremity edema and palpitations  Gastrointestinal: negative for abdominal pain, constipation, diarrhea, jaundice, melena, odynophagia, reflux symptoms and vomiting  Hematologic/lymphatic: negative for bleeding, easy bruising, lymphadenopathy and petechiae  Musculoskeletal:negative for arthralgias, bone pain, muscle weakness, neck pain and stiff joints  Neurological: negative for dizziness, gait problems, headaches, seizures, speech problems, tremors and weakness  Behavioral/Psych: negative for anxiety, behavior problems, depression, fatigue and sleep disturbance  Endocrine: negative for diabetic symptoms including none, neuropathy, polyphagia, 
Patient resting in bed with eyes closed. PRN tessalon given per patient request. Medication effective and no adverse effects noted. Call light within reach and patient denies further needs at this time.   
Patient: Santa Restrepo MRN: [id number]  Date of  Admission: 10/11/2024   YOB: 1970  Age: 54 y.o.  Sex: female    Unit: [place] [address] MED SURG/ORTHO  Room/Bed: 45/0545-01 Admitting Physician: ANNA BRODERICK    Attending Physician:  Saurabh Finley MD         Pulmonary Service Note      SUBJECTIVE:    Patient off oxygen therapy this afternoon.  She says she is feeling better.  She has no complaints.        OBJECTIVE    Medications    Continuous Infusions:   sodium chloride 25 mL/hr at 10/13/24 1714       Scheduled Meds:   azithromycin  500 mg Oral Daily    sodium chloride flush  5-40 mL IntraVENous 2 times per day    enoxaparin  40 mg SubCUTAneous Daily    cefepime  2,000 mg IntraVENous Once    lactobacillus  1 capsule Oral BID WC    [START ON 10/15/2024] estradiol  1 patch TransDERmal Once per day on Tuesday Friday    progesterone  100 mg Oral Nightly    tretinoin   Topical Every Other Day       PRN Meds:  albuterol, sodium chloride flush, sodium chloride, ondansetron **OR** ondansetron, polyethylene glycol, acetaminophen **OR** acetaminophen, benzonatate, guaiFENesin    Physical    Patient Vitals for the past 24 hrs:   BP Temp Temp src Pulse Resp SpO2   10/14/24 1406 -- -- -- -- -- 95 %   10/14/24 0914 -- -- -- 82 -- 91 %   10/14/24 0909 -- -- -- -- -- 91 %   10/14/24 0906 100/66 98.8 °F (37.1 °C) Oral 81 18 (!) 89 %   10/13/24 1934 103/68 98.8 °F (37.1 °C) Oral 87 16 92 %        Physical Exam  Constitutional:       General: She is not in acute distress.     Appearance: She is not toxic-appearing.   HENT:      Head: Normocephalic and atraumatic.      Nose: Nose normal.      Mouth/Throat:      Pharynx: No oropharyngeal exudate.   Eyes:      General: No scleral icterus.        Right eye: No discharge.         Left eye: No discharge.   Cardiovascular:      Rate and Rhythm: Normal rate and regular rhythm.      Heart sounds: No murmur heard.     No friction rub. No gallop.   Pulmonary:      Effort: 
Pt given d/c instructions and verbalizes understanding.   
10/15/2024] estradiol  1 patch TransDERmal Once per day on Tuesday Friday    progesterone  100 mg Oral Nightly    tretinoin   Topical Every Other Day     PRN Meds: sodium chloride flush, sodium chloride, ondansetron **OR** ondansetron, polyethylene glycol, acetaminophen **OR** acetaminophen, benzonatate, guaiFENesin     Labs:  Personally reviewed and interpreted for clinical significance.     CBC:   Recent Labs     10/11/24  0849 10/12/24  0516   WBC 11.1* 11.6*   HGB 12.5 11.4*   HCT 38.4 34.4*   MCV 87.2 88.4    278     BMP, Mag, Phos:    Recent Labs     10/11/24  0849 10/12/24  0516    136   K 3.8 4.4    105   CO2 24 22   BUN 11 13   CREATININE 0.9 0.9   CALCIUM 9.4 8.3     Pro-BNP, Trop:  No results for input(s): \"PROBNP\", \"TROPHS\" in the last 72 hours.  LFTs:    Recent Labs     10/11/24  0849   AST 19   ALT 16   BILITOT 0.4   ALKPHOS 93     PT/INR, Lactic acid, TSH:    Recent Labs     10/11/24  0849   LACTA 1.0     HgbA1c:  No results for input(s): \"LABA1C\" in the last 72 hours.  Glucose trend:  No results found for: \"GLU\"    UA:No results for input(s): \"NITRITE\", \"COLORU\", \"PHUR\", \"LABCAST\", \"WBCUA\", \"RBCUA\", \"MUCUS\", \"TRICHOMONAS\", \"YEAST\", \"BACTERIA\", \"CLARITYU\", \"SPECGRAV\", \"LEUKOCYTESUR\", \"UROBILINOGEN\", \"BILIRUBINUR\", \"BLOODU\", \"GLUCOSEU\", \"AMORPHOUS\" in the last 72 hours.    Invalid input(s): \"KETONESU\"  Urine Cultures:   Lab Results   Component Value Date/Time    LABURIN 75,000 CFU/ml 12/16/2022 02:29 PM     Blood Cultures:   Lab Results   Component Value Date/Time    BC  10/11/2024 10:37 AM     No Growth to date.  Any change in status will be called.     Lab Results   Component Value Date/Time    BLOODCULT2  10/11/2024 11:12 AM     No Growth to date.  Any change in status will be called.     Organism:   Lab Results   Component Value Date/Time    ORG Mycoplasma pneumoniae by PCR 10/11/2024 08:49 PM         Invalid input(s): \"ABG\"        Electronically signed by Sheridan Acosta, 
PM    ------------------------------------------------------------------------------------------------------------------------------------------------------------------------

## 2024-10-15 NOTE — PLAN OF CARE
Problem: Discharge Planning  Goal: Discharge to home or other facility with appropriate resources  10/14/2024 2325 by Garima Og RN  Outcome: Progressing  10/14/2024 0951 by Micky Balderas RN  Outcome: Progressing     Problem: Pain  Goal: Verbalizes/displays adequate comfort level or baseline comfort level  10/14/2024 2325 by Garima Og RN  Outcome: Progressing  10/14/2024 0951 by Micky Balderas RN  Outcome: Progressing     Problem: Safety - Adult  Goal: Free from fall injury  10/14/2024 2325 by Garima Og RN  Outcome: Progressing  10/14/2024 0951 by Micky Balderas RN  Outcome: Progressing     Problem: Respiratory - Adult  Goal: Achieves optimal ventilation and oxygenation  10/14/2024 2325 by Garima Og RN  Outcome: Progressing  10/14/2024 0951 by Micky Balderas RN  Outcome: Progressing     Problem: Infection - Adult  Goal: Absence of infection at discharge  10/14/2024 2325 by Garima Og RN  Outcome: Progressing  10/14/2024 0951 by Micky Balderas RN  Outcome: Progressing

## 2024-10-16 ENCOUNTER — TELEPHONE (OUTPATIENT)
Dept: FAMILY MEDICINE CLINIC | Age: 54
End: 2024-10-16

## 2024-10-16 NOTE — TELEPHONE ENCOUNTER
Care Transitions Initial Follow Up Call    Outreach made within 2 business days of discharge: Yes    Patient: Santa Restrepo Patient : 1970   MRN: [id number]  Reason for Admission: There are no discharge diagnoses documented for the most recent discharge.  Discharge Date: 10/15/24       Spoke with: Santa Restrepo    Discharge department/facility: [facility] Interactive Patient Contact:  Was patient able to fill all prescriptions: Yes  Was patient instructed to bring all medications to the follow-up visit: Yes  Is patient taking all medications as directed in the discharge summary? Yes  Does patient understand their discharge instructions: Yes  Does patient have questions or concerns that need addressed prior to 7-14 day follow up office visit: no    Scheduled appointment with PCP within 7-14 days    Follow Up  Future Appointments   Date Time Provider Department Center   10/17/2024  4:00 PM Ilda Sinclair PA [facility]       Chun Duffy MA

## 2024-10-17 ENCOUNTER — OFFICE VISIT (OUTPATIENT)
Dept: FAMILY MEDICINE CLINIC | Age: 54
End: 2024-10-17

## 2024-10-17 VITALS
BODY MASS INDEX: 23.99 KG/M2 | TEMPERATURE: 97.7 F | OXYGEN SATURATION: 95 % | HEART RATE: 86 BPM | RESPIRATION RATE: 18 BRPM | WEIGHT: 144 LBS | DIASTOLIC BLOOD PRESSURE: 64 MMHG | HEIGHT: 65 IN | SYSTOLIC BLOOD PRESSURE: 116 MMHG

## 2024-10-17 DIAGNOSIS — Z09 HOSPITAL DISCHARGE FOLLOW-UP: Primary | ICD-10-CM

## 2024-10-17 DIAGNOSIS — N39.0 ACUTE UTI (URINARY TRACT INFECTION): ICD-10-CM

## 2024-10-17 DIAGNOSIS — B99.9 RECURRENT INFECTIONS: ICD-10-CM

## 2024-10-17 DIAGNOSIS — J15.7 PNEUMONIA OF BOTH LUNGS DUE TO MYCOPLASMA PNEUMONIAE, UNSPECIFIED PART OF LUNG: ICD-10-CM

## 2024-10-17 DIAGNOSIS — N39.0 RECURRENT UTI: ICD-10-CM

## 2024-10-17 LAB
APPEARANCE FLUID: NORMAL
BILIRUBIN, POC: NEGATIVE
BLOOD URINE, POC: NORMAL
CLARITY, POC: CLEAR
COLOR, POC: YELLOW
GLUCOSE URINE, POC: NEGATIVE MG/DL
KETONES, POC: NEGATIVE MG/DL
LEUKOCYTE EST, POC: NORMAL
NITRITE, POC: NEGATIVE
PH, POC: 5.5
PROTEIN, POC: NORMAL MG/DL
SPECIFIC GRAVITY, POC: 1.01
UROBILINOGEN, POC: 0.2 MG/DL

## 2024-10-17 RX ORDER — AZITHROMYCIN 500 MG/1
500 TABLET, FILM COATED ORAL DAILY
Qty: 4 TABLET | Refills: 0 | Status: SHIPPED | OUTPATIENT
Start: 2024-10-17 | End: 2024-10-21

## 2024-10-17 RX ORDER — ESTRADIOL 0.1 MG/D
1 FILM, EXTENDED RELEASE TRANSDERMAL
COMMUNITY
Start: 2024-07-10

## 2024-10-17 ASSESSMENT — PATIENT HEALTH QUESTIONNAIRE - PHQ9
SUM OF ALL RESPONSES TO PHQ QUESTIONS 1-9: 0
2. FEELING DOWN, DEPRESSED OR HOPELESS: NOT AT ALL
7. TROUBLE CONCENTRATING ON THINGS, SUCH AS READING THE NEWSPAPER OR WATCHING TELEVISION: NOT AT ALL
SUM OF ALL RESPONSES TO PHQ9 QUESTIONS 1 & 2: 0
SUM OF ALL RESPONSES TO PHQ QUESTIONS 1-9: 0
3. TROUBLE FALLING OR STAYING ASLEEP: NOT AT ALL
10. IF YOU CHECKED OFF ANY PROBLEMS, HOW DIFFICULT HAVE THESE PROBLEMS MADE IT FOR YOU TO DO YOUR WORK, TAKE CARE OF THINGS AT HOME, OR GET ALONG WITH OTHER PEOPLE: NOT DIFFICULT AT ALL
SUM OF ALL RESPONSES TO PHQ QUESTIONS 1-9: 0
9. THOUGHTS THAT YOU WOULD BE BETTER OFF DEAD, OR OF HURTING YOURSELF: NOT AT ALL
4. FEELING TIRED OR HAVING LITTLE ENERGY: NOT AT ALL
8. MOVING OR SPEAKING SO SLOWLY THAT OTHER PEOPLE COULD HAVE NOTICED. OR THE OPPOSITE, BEING SO FIGETY OR RESTLESS THAT YOU HAVE BEEN MOVING AROUND A LOT MORE THAN USUAL: NOT AT ALL
6. FEELING BAD ABOUT YOURSELF - OR THAT YOU ARE A FAILURE OR HAVE LET YOURSELF OR YOUR FAMILY DOWN: NOT AT ALL
5. POOR APPETITE OR OVEREATING: NOT AT ALL
SUM OF ALL RESPONSES TO PHQ QUESTIONS 1-9: 0
1. LITTLE INTEREST OR PLEASURE IN DOING THINGS: NOT AT ALL

## 2024-10-17 NOTE — PROGRESS NOTES
Post-Discharge Transitional Care  Follow Up      Santa Restrepo   YOB: 1970    Date of Office Visit:  10/17/2024  Date of Hospital Admission: 10/11/24  Date of Hospital Discharge: 10/15/24  Risk of hospital readmission (high >=14%. Medium >=10%) :Readmission Risk Score: 2.9      Care management risk score Rising risk (score 2-5) and Complex Care (Scores >=6): No Risk Score On File     Non face to face  following discharge, date last encounter closed (first attempt may have been earlier): 10/16/2024    Call initiated 2 business days of discharge: Yes    ASSESSMENT/PLAN:   Hospital discharge follow-up  Pneumonia of both lungs due to Mycoplasma pneumoniae, unspecified part of lung  -     RI DISCHARGE MEDS RECONCILED W/ CURRENT OUTPATIENT MED LIST  -  Continue azithromycin 500mg  for total 5 days.  -  Out of work for next week. Rest. Double fluids.    Recurrent infections  -   Recurrent UTIs, Recurrent respiratory illnesses refractory to multiple treatment regimens.   - Referred to Infectious Disease, Angélica Hickey MD, Infectious Disease,       Recurrent UTIs    Trimethoprim controlled until discontinued by Urology 3 weeks ago. Urinary symptoms have returned. Advise to restart Trimethoprim daily.      Medical Decision Making: high complexity  Return if symptoms worsen or fail to improve.    On this date 10/17/2024 I have spent 55 minutes reviewing previous notes, test results and face to face with the patient discussing the diagnosis and importance of compliance with the treatment plan as well as documenting on the day of the visit.       Subjective:   HPI:  Follow up of Hospital problems/diagnosis(es): admitted for bilateral mycoplasma pneumonia.    Inpatient course: Discharge summary reviewed- see chart.    Interval history/Current status: started with URI sinusitis, zpak helped  but then went to lungs. Went back to  Urgent care, Augmentin did not help at all, then went to emergency department and

## 2024-10-21 ENCOUNTER — TELEMEDICINE (OUTPATIENT)
Dept: FAMILY MEDICINE CLINIC | Age: 54
End: 2024-10-21
Payer: COMMERCIAL

## 2024-10-21 DIAGNOSIS — U07.1 COVID: Primary | ICD-10-CM

## 2024-10-21 PROCEDURE — 99213 OFFICE O/P EST LOW 20 MIN: CPT | Performed by: PHYSICIAN ASSISTANT

## 2024-10-21 RX ORDER — IBUPROFEN 400 MG/1
400 TABLET, FILM COATED ORAL EVERY 6 HOURS PRN
COMMUNITY

## 2024-10-21 RX ORDER — NIRMATRELVIR AND RITONAVIR 150-100 MG
KIT ORAL
Qty: 20 TABLET | Refills: 0 | Status: SHIPPED | OUTPATIENT
Start: 2024-10-21 | End: 2024-10-26

## 2024-10-21 ASSESSMENT — ENCOUNTER SYMPTOMS
SORE THROAT: 1
COUGH: 1
RHINORRHEA: 1

## 2024-10-21 ASSESSMENT — PATIENT HEALTH QUESTIONNAIRE - PHQ9
SUM OF ALL RESPONSES TO PHQ QUESTIONS 1-9: 0
SUM OF ALL RESPONSES TO PHQ QUESTIONS 1-9: 0
1. LITTLE INTEREST OR PLEASURE IN DOING THINGS: NOT AT ALL
SUM OF ALL RESPONSES TO PHQ QUESTIONS 1-9: 0
2. FEELING DOWN, DEPRESSED OR HOPELESS: NOT AT ALL
SUM OF ALL RESPONSES TO PHQ9 QUESTIONS 1 & 2: 0
SUM OF ALL RESPONSES TO PHQ QUESTIONS 1-9: 0

## 2024-10-21 NOTE — PROGRESS NOTES
Pulmonary/Chest: [x] Respiratory effort normal.  [x] No visualized signs of difficulty breathing or respiratory distress        [] Abnormal-            ASSESSMENT/PLAN:     Diagnosis Orders   1. COVID  With recent pneumonia will start paxlovid. Patient is to call if no improvement or signs and symptoms worsen.                  Santa Restrepo, was evaluated through a synchronous (real-time) audio-video encounter. The patient (or guardian if applicable) is aware that this is a billable service, which includes applicable co-pays. This Virtual Visit was conducted with patient's (and/or legal guardian's) consent. Patient identification was verified, and a caregiver was present when appropriate.   The patient was located at Home: [address] [place] [address]  Provider was located at Facility (Appt Dept): [address]   [address]  [place] [address]  Confirm you are appropriately licensed, registered, or certified to deliver care in the state where the patient is located as indicated above. If you are not or unsure, please re-schedule the visit: Yes, I confirm.       Total time spent on this encounter:  20    --HOPE Connolly on 10/21/2024 at 1:43 PM    An electronic signature was used to authenticate this note.

## 2024-10-22 ENCOUNTER — TELEPHONE (OUTPATIENT)
Dept: INFECTIOUS DISEASES | Age: 54
End: 2024-10-22

## 2024-11-11 ENCOUNTER — TELEMEDICINE (OUTPATIENT)
Dept: FAMILY MEDICINE CLINIC | Age: 54
End: 2024-11-11

## 2024-11-11 ENCOUNTER — HOSPITAL ENCOUNTER (OUTPATIENT)
Dept: GENERAL RADIOLOGY | Age: 54
Discharge: HOME | End: 2024-11-11
Payer: COMMERCIAL

## 2024-11-11 ENCOUNTER — HOSPITAL ENCOUNTER (OUTPATIENT)
Age: 54
Discharge: HOME | End: 2024-11-11
Payer: COMMERCIAL

## 2024-11-11 DIAGNOSIS — Z86.16 HISTORY OF COVID-19: ICD-10-CM

## 2024-11-11 DIAGNOSIS — B99.9 RECURRENT INFECTIONS: ICD-10-CM

## 2024-11-11 DIAGNOSIS — R05.8 PRODUCTIVE COUGH: ICD-10-CM

## 2024-11-11 DIAGNOSIS — R05.8 PRODUCTIVE COUGH: Primary | ICD-10-CM

## 2024-11-11 DIAGNOSIS — J15.7 PNEUMONIA OF BOTH LUNGS DUE TO MYCOPLASMA PNEUMONIAE, UNSPECIFIED PART OF LUNG: ICD-10-CM

## 2024-11-11 PROCEDURE — 71046 X-RAY EXAM CHEST 2 VIEWS: CPT

## 2024-11-11 RX ORDER — METHYLPREDNISOLONE 4 MG/1
TABLET ORAL
Qty: 1 KIT | Refills: 0 | Status: SHIPPED | OUTPATIENT
Start: 2024-11-11 | End: 2024-11-17

## 2024-11-11 RX ORDER — ACETAMINOPHEN 500 MG
500 TABLET ORAL EVERY 6 HOURS PRN
COMMUNITY

## 2024-11-11 NOTE — PROGRESS NOTES
[facility]    24     TELEHEALTH EVALUATION -- Audio/Visual (During COVID-19 public health emergency)    2024    HPI:  Chief Complaint   Patient presents with    Pneumonia     Finished full course of abx, symptoms starting to return last week. Productive cough and sharp chest rib pains       Santa Restrepo (:  1970) has requested an audio/video evaluation for the following concern(s):   Mycoplasma Pneumonia 10/11/2024 hospitalized, then COVID 10/21/2024.  Completed Paxlovid and began to feel better.   Cough never resolved and now has become productive and wet. The sharp low left lateral chest pain. Denies fever, No dyspnea , O2 sat 97%. She took 2 courses of oral Zpak after IV in hosp. Is scheduled with Infectious Disease next week due to recurrent UTI and URIs      ROS: Denies loss of taste or smell, fever, headache, purulent nasal discharge, facial pain/sinus pressure, shortness of breath, wheezing/chest tightness, dizziness, nausea/vomiting, diarrhea, and neck pain.       Prior to Visit Medications    Medication Sig Taking? Authorizing Provider   acetaminophen (TYLENOL) 500 MG tablet Take 1 tablet by mouth every 6 hours as needed for Pain Yes Ashley Espinosa MD   methylPREDNISolone (MEDROL DOSEPACK) 4 MG tablet Take by mouth. Yes Ilda Sinclair PA   ibuprofen (ADVIL;MOTRIN) 400 MG tablet Take 1 tablet by mouth every 6 hours as needed for Pain Yes Ashley Espinosa MD   estradiol (VIVELLE) 0.1 MG/24HR Place 1 patch onto the skin Twice a Week Yes Ashley Espinosa MD   progesterone (PROMETRIUM) 100 MG CAPS capsule Take 1 capsule by mouth daily Take at Hs Yes Ashley Espinosa MD   tretinoin (RETIN-A) 0.025 % cream Apply topically every other day Yes Ashley Espinosa MD   estradiol (ESTRACE) 0.1 MG/GM vaginal cream  Yes Ashley Espinosa MD   Zinc Oxide-Vitamin C (ZINC PLUS VITAMIN C PO) Take by mouth  Patient not taking: Reported on 2024

## 2024-11-19 ENCOUNTER — OFFICE VISIT (OUTPATIENT)
Dept: INFECTIOUS DISEASES | Age: 54
End: 2024-11-19
Payer: COMMERCIAL

## 2024-11-19 VITALS
DIASTOLIC BLOOD PRESSURE: 60 MMHG | TEMPERATURE: 98 F | OXYGEN SATURATION: 97 % | WEIGHT: 143 LBS | HEIGHT: 65 IN | SYSTOLIC BLOOD PRESSURE: 110 MMHG | HEART RATE: 78 BPM | BODY MASS INDEX: 23.82 KG/M2

## 2024-11-19 DIAGNOSIS — N39.0 RECURRENT UTI: ICD-10-CM

## 2024-11-19 DIAGNOSIS — J15.7 PNEUMONIA OF BOTH LUNGS DUE TO MYCOPLASMA PNEUMONIAE, UNSPECIFIED PART OF LUNG: Primary | ICD-10-CM

## 2024-11-19 PROCEDURE — 99204 OFFICE O/P NEW MOD 45 MIN: CPT | Performed by: INTERNAL MEDICINE

## 2024-11-19 NOTE — PROGRESS NOTES
Infectious Diseases   Consult Note      Reason for Consult:  rec UTI, pna    Requesting Physician:   Yahir         Subjective:   CHIEF COMPLAINT:  none given       HPI:    Santa Restrepo is a 54yoF with limited medical history    Rec nephrolithiasis in the context of parathyroid adenoma, hypercalciuria.  She has had multiple urologic procedures, stents.    Sometime around mid-Sept, developed sinus symptoms, then fever.   Urgent Care visits 9/22 and 9/30  At first visit, was given a Zpack and felt better with relapsing, escalating symptoms soon after the course was completed.    At the 2nd visit was given Rx Augmentin with no improvement.     No viral testing was performed during those encounters.  She continued to feel worse.     Admission 10/11-10/15/24 with bilateral pna, acute hypoxemic respiratory failure.  She had a stuttering course over the preceding month.    Mycoplasma pneumoniae was isolated by DNA detection.   Following discharge, had 7d course of azithro    Then on or around 10/21/24, tested positive for COVID.  She was given Rx Paxlovid. Her symptoms were very mild.      Another PCP visit 11/11/24, chest pain and cough.  Got steroid pack which relieved the chest pain.  Cough is resolving.      No prior history of pna.  Infrequent sinus, URI infections  She clearly feels like she is getting better now though not back to her baseline.    Additional history of rec UTI   Historically, those occurred infrequently  Was using vaginal estrogen for several years   The pattern of infections changed, more frequent and more severe  Symptoms would respond fairly readily to abx with relapsing course  Ppx abx were effective in preventing infections, she took it for ~2 years, stopped in 9/2024   No UTI since then.     Previously saw Urology     Here today for evaluation to ensure there is no immune defect or other issue that has led to these infections and slow recovery.                      Past Surgical History:

## 2024-12-28 DIAGNOSIS — R79.89 ELEVATED VITAMIN B12 LEVEL: ICD-10-CM

## 2024-12-28 LAB — VIT B12 SERPL-MCNC: 1050 PG/ML (ref 211–911)

## 2024-12-28 PROCEDURE — 36415 COLL VENOUS BLD VENIPUNCTURE: CPT | Performed by: PHYSICIAN ASSISTANT

## 2025-01-11 ENCOUNTER — HOSPITAL ENCOUNTER (OUTPATIENT)
Age: 55
Discharge: HOME | End: 2025-01-11

## 2025-01-11 LAB
ESTRADIOL SERPL-MCNC: 82 PG/ML
PROGEST SERPL-MCNC: 1.28 NG/ML

## 2025-01-15 LAB
SHBG SERPL-SCNC: 81 NMOL/L (ref 17–125)
TESTOST FREE SERPL-MCNC: <1 PG/ML (ref 0.6–3.8)
TESTOST SERPL-MCNC: 10 NG/DL (ref 3–41)

## 2025-02-10 PROBLEM — M85.80 OSTEOPENIA: Status: ACTIVE | Noted: 2025-02-10

## 2025-02-10 NOTE — PROGRESS NOTES
[facility] MEDICINE   ANNUAL MEDICAL EXAMINATION       2/12/2025       CC:    Chief Complaint   Patient presents with    Annual Exam     Patient is here for a physical, she is not fasting for blood work today       HPI: Santa Restrepo 1970 is a 55 y.o. who presents for annual medical examination.  Postmenopausal DUB-  being followed by Dr Gant. On hormones    Osteopenia- DEXA 4/2024 worse from 2015 test. No fractures..    Right thyroid nodule- feels like mass is enlarging. No dysphagia. No wt changes.  FNA was negative in Jan 2024;  Thyroid function tests have normal. H/p parathyroidectomy.      Recurrent UTI infections- evaluated by ID 11/2024. Urologist put her back on daily trimethoprim.    PREVENTIVE HEALTH:   Last eye exam:    Glasses June2021  Hearing concerns: No  Last Dental exam:    Every 6 Months  Caffeine use:  2/ day  Exercise:  since OP diagnosis, now  goes to gym 2x/w wt bearing exercises    Diet  Feels diet is healthy, stopped Vegetarian, now eating fish  and chicken.  Alcohol use: none  Tobacco/ Vapping/ marijuana use:  never  Mental Health; concerns of anxiety or depression?  No   Perform routine skin checks? sees derm  Perform monthly self breast exams?  No  Last Mammo:   done on 10/2020, \" I  am working on it\"  Last gyn exam: <1 yr. Dr Kong    Colonoscopy:  No     Living will: no             REVIEW OF SYSTEMS:    Pertinent positive and negatives are in HPI. The remaining reviewed and are unremarkable for other constitutional, EENT, cardiac, pulmonary, GI, , neurologic, musculoskeletal, or integumentary complaints    PAST MEDICAL/SURGICAL/SOCIAL HISTORY:  Reviewed and updated    ALLERGIES:    Promethazine and Macrobid [nitrofurantoin monohyd macro]    MEDICATIONS:  Current Outpatient Medications on File Prior to Visit   Medication Sig Dispense Refill    estradiol (ESTRACE) 1 MG tablet Take 1 tablet by mouth daily      trimethoprim (TRIMPEX) 100 MG tablet Take 1 tablet by

## 2025-02-12 ENCOUNTER — OFFICE VISIT (OUTPATIENT)
Dept: FAMILY MEDICINE CLINIC | Age: 55
End: 2025-02-12

## 2025-02-12 VITALS
HEART RATE: 72 BPM | HEIGHT: 65 IN | DIASTOLIC BLOOD PRESSURE: 60 MMHG | OXYGEN SATURATION: 97 % | WEIGHT: 147.4 LBS | RESPIRATION RATE: 16 BRPM | SYSTOLIC BLOOD PRESSURE: 108 MMHG | TEMPERATURE: 96.8 F | BODY MASS INDEX: 24.56 KG/M2

## 2025-02-12 DIAGNOSIS — E04.1 NODULE OF RIGHT LOBE OF THYROID GLAND: ICD-10-CM

## 2025-02-12 DIAGNOSIS — M85.80 OSTEOPENIA, UNSPECIFIED LOCATION: ICD-10-CM

## 2025-02-12 DIAGNOSIS — Z00.00 ANNUAL PHYSICAL EXAM: Primary | ICD-10-CM

## 2025-02-12 DIAGNOSIS — B99.9 RECURRENT INFECTIONS: ICD-10-CM

## 2025-02-12 DIAGNOSIS — Z12.11 COLON CANCER SCREENING: ICD-10-CM

## 2025-02-12 LAB
T3FREE SERPL-MCNC: 3 PG/ML (ref 2.3–4.2)
T4 FREE SERPL-MCNC: 1.2 NG/DL (ref 0.9–1.8)
TSH SERPL DL<=0.005 MIU/L-ACNC: 2.44 UIU/ML (ref 0.27–4.2)

## 2025-02-12 RX ORDER — TRIMETHOPRIM 100 MG/1
100 TABLET ORAL DAILY
COMMUNITY
Start: 2024-12-19

## 2025-02-12 RX ORDER — ESTRADIOL 1 MG/1
1 TABLET ORAL DAILY
COMMUNITY
Start: 2025-01-07

## 2025-02-12 ASSESSMENT — PATIENT HEALTH QUESTIONNAIRE - PHQ9
3. TROUBLE FALLING OR STAYING ASLEEP: NOT AT ALL
10. IF YOU CHECKED OFF ANY PROBLEMS, HOW DIFFICULT HAVE THESE PROBLEMS MADE IT FOR YOU TO DO YOUR WORK, TAKE CARE OF THINGS AT HOME, OR GET ALONG WITH OTHER PEOPLE: NOT DIFFICULT AT ALL
2. FEELING DOWN, DEPRESSED OR HOPELESS: NOT AT ALL
7. TROUBLE CONCENTRATING ON THINGS, SUCH AS READING THE NEWSPAPER OR WATCHING TELEVISION: NOT AT ALL
4. FEELING TIRED OR HAVING LITTLE ENERGY: NOT AT ALL
8. MOVING OR SPEAKING SO SLOWLY THAT OTHER PEOPLE COULD HAVE NOTICED. OR THE OPPOSITE, BEING SO FIGETY OR RESTLESS THAT YOU HAVE BEEN MOVING AROUND A LOT MORE THAN USUAL: NOT AT ALL
6. FEELING BAD ABOUT YOURSELF - OR THAT YOU ARE A FAILURE OR HAVE LET YOURSELF OR YOUR FAMILY DOWN: NOT AT ALL
SUM OF ALL RESPONSES TO PHQ QUESTIONS 1-9: 0
9. THOUGHTS THAT YOU WOULD BE BETTER OFF DEAD, OR OF HURTING YOURSELF: NOT AT ALL
1. LITTLE INTEREST OR PLEASURE IN DOING THINGS: NOT AT ALL
5. POOR APPETITE OR OVEREATING: NOT AT ALL
SUM OF ALL RESPONSES TO PHQ9 QUESTIONS 1 & 2: 0
SUM OF ALL RESPONSES TO PHQ QUESTIONS 1-9: 0

## 2025-02-12 NOTE — PATIENT INSTRUCTIONS
Healthy Living Recommendations 2025:  Exercise 150 minutes/ week: Aerobic and Weights Aerobic exercise strengthens muscle while weights and resistance strengthens bone. Body Mass Index (BMI) goal is 25.     Nutrition: Avoid salting foods. Limit caffeine to less than 3 cups caffeine/day. Limit carbohydrates like breads, rice, and pastas. Limit sugar intake. Avoid processed and fried foods. Eat baked or broiled foods. One can never have too many vegetables and fruits which are good fiber source. Fiber lowers glucose levels. Studies show diets high in red meat and processed foods WILL increase the risk heart and liver diseases, cancers, and dementia.   Sugar : Female: Max 6 tsp or 24 grams/ day               Male: Max 9 tsp or 28 grams/ day  Protein:   Protects immune system, regulate hormones,and builds muscle. High protein foods: [place], Greek yogurt, Chicken, Lentils, Eggs  Calcium/ Vit D3  Stimulates bone growth. Promotes digestion, kidneys, and mental health.   High calcium foods:  milk, yogurt, cheese, beans, dark green leafy vegetables. High Vitamin D foods:  salmon, tuna fish, fortified cereals, mushrooms.  Tobacco: Avoid all smoking and vapping.      Eye exam every 2 years after age 40.   Dental; Brush twice a day. Floss daily. Dentist exam every 6 months.  Noise: recurrent LOUD noise WILL result in hearing loss. If wearing Earbuds or headphones, keep volume below 50%. Just 5 minutes at 100% volume will result in permanent hearing loss.   Colonoscopy Begin at age 45 or sooner if family history.    Skin:  Perform monthly skin check for changes.  Breasts/Mammograms: Perform monthly self-breast exam for changes. FM: Yearly mammograms begin age 40. Repeat yearly.  -Cologuard ordered  -Continue the wt bear exercises  -Thyroid US w ENT fu      [facility]   [address], [address], [place] [id number]  Office hours: Monday - Friday 7 am- 5 pm. Phone lines turn on at 8 am.   Office PH:

## 2025-02-15 ENCOUNTER — HOSPITAL ENCOUNTER (OUTPATIENT)
Dept: ULTRASOUND IMAGING | Age: 55
Discharge: HOME | End: 2025-02-15
Payer: COMMERCIAL

## 2025-02-15 DIAGNOSIS — E04.1 NODULE OF RIGHT LOBE OF THYROID GLAND: ICD-10-CM

## 2025-02-15 PROCEDURE — 76536 US EXAM OF HEAD AND NECK: CPT

## 2025-02-21 LAB — NONINV COLON CA DNA+OCC BLD SCRN STL QL: NEGATIVE

## 2025-03-22 ENCOUNTER — HOSPITAL ENCOUNTER (OUTPATIENT)
Age: 55
Discharge: HOME | End: 2025-03-22
Payer: COMMERCIAL

## 2025-03-22 LAB
ESTRADIOL SERPL-MCNC: 36 PG/ML
PROGEST SERPL-MCNC: 0.25 NG/ML

## 2025-03-22 PROCEDURE — 36415 COLL VENOUS BLD VENIPUNCTURE: CPT

## 2025-03-22 PROCEDURE — 84144 ASSAY OF PROGESTERONE: CPT

## 2025-03-22 PROCEDURE — 82670 ASSAY OF TOTAL ESTRADIOL: CPT

## 2025-03-25 LAB
SHBG SERPL-SCNC: 51 NMOL/L (ref 17–125)
TESTOST FREE SERPL-MCNC: <1 PG/ML (ref 0.6–3.8)
TESTOST SERPL-MCNC: 3 NG/DL (ref 3–41)

## 2025-05-23 ENCOUNTER — HOSPITAL ENCOUNTER (OUTPATIENT)
Dept: LAB | Age: 55
Discharge: HOME | End: 2025-05-23

## 2025-05-23 ENCOUNTER — TRANSCRIBE ORDERS (OUTPATIENT)
Dept: ADMISSION | Age: 55
End: 2025-05-23

## 2025-05-23 DIAGNOSIS — Z79.890 NEED FOR PROPHYLACTIC HORMONE REPLACEMENT THERAPY (POSTMENOPAUSAL): ICD-10-CM

## 2025-05-23 DIAGNOSIS — Z79.890 PERSONAL HISTORY OF ONGOING TREATMENT WITH HORMONAL THERAPY: Primary | ICD-10-CM

## 2025-05-23 LAB
ESTRADIOL SERPL-MCNC: 24 PG/ML
PROGEST SERPL-MCNC: 0.86 NG/ML

## 2025-05-28 LAB
SHBG SERPL-SCNC: 94 NMOL/L (ref 17–125)
TESTOST FREE SERPL-MCNC: 1.3 PG/ML (ref 0.6–3.8)
TESTOST SERPL-MCNC: 15 NG/DL (ref 3–41)

## 2025-07-26 ENCOUNTER — HOSPITAL ENCOUNTER (OUTPATIENT)
Dept: LAB | Age: 55
Discharge: HOME | End: 2025-07-26
Payer: COMMERCIAL

## 2025-07-26 LAB
ESTRADIOL SERPL-MCNC: 30 PG/ML
PROGEST SERPL-MCNC: 0.09 NG/ML

## 2025-07-26 PROCEDURE — 82670 ASSAY OF TOTAL ESTRADIOL: CPT

## 2025-07-26 PROCEDURE — 84270 ASSAY OF SEX HORMONE GLOBUL: CPT

## 2025-07-26 PROCEDURE — 84144 ASSAY OF PROGESTERONE: CPT

## 2025-07-26 PROCEDURE — 84403 ASSAY OF TOTAL TESTOSTERONE: CPT

## 2025-07-26 PROCEDURE — 36415 COLL VENOUS BLD VENIPUNCTURE: CPT

## 2025-07-30 LAB
SHBG SERPL-SCNC: 66 NMOL/L (ref 17–125)
TESTOST FREE SERPL-MCNC: <1 PG/ML (ref 0.6–3.8)
TESTOST SERPL-MCNC: 7 NG/DL (ref 3–41)

## 2025-07-31 ENCOUNTER — TELEPHONE (OUTPATIENT)
Dept: FAMILY MEDICINE CLINIC | Age: 55
End: 2025-07-31

## 2025-07-31 NOTE — TELEPHONE ENCOUNTER
Patient called to state that her recent lab orders which she had at [facility] were placed under the wrong physician, she is asking if these can be faxed or rerouted to the correct ordering provider which is Dr.Lisa Gant, please fax to [phone].  Labs have been faxed.